# Patient Record
Sex: FEMALE | Race: WHITE | NOT HISPANIC OR LATINO | Employment: UNEMPLOYED | URBAN - NONMETROPOLITAN AREA
[De-identification: names, ages, dates, MRNs, and addresses within clinical notes are randomized per-mention and may not be internally consistent; named-entity substitution may affect disease eponyms.]

---

## 2020-10-13 ENCOUNTER — HOSPITAL ENCOUNTER (OUTPATIENT)
Dept: ULTRASOUND IMAGING | Facility: HOSPITAL | Age: 30
Discharge: HOME | End: 2020-10-13
Admitting: FAMILY MEDICINE

## 2020-10-13 ENCOUNTER — TRANSCRIBE ORDERS (OUTPATIENT)
Dept: ADMINISTRATIVE | Facility: HOSPITAL | Age: 30
End: 2020-10-13

## 2020-10-13 DIAGNOSIS — R10.84 GENERALIZED ABDOMINAL PAIN: Primary | ICD-10-CM

## 2020-10-13 DIAGNOSIS — R10.84 GENERALIZED ABDOMINAL PAIN: ICD-10-CM

## 2020-10-13 PROCEDURE — 76705 ECHO EXAM OF ABDOMEN: CPT | Performed by: RADIOLOGY

## 2020-10-13 PROCEDURE — 76705 ECHO EXAM OF ABDOMEN: CPT

## 2020-12-07 ENCOUNTER — HOSPITAL ENCOUNTER (EMERGENCY)
Facility: HOSPITAL | Age: 30
Discharge: LEFT BEFORE DISCHARGE | End: 2020-12-07

## 2020-12-07 VITALS
SYSTOLIC BLOOD PRESSURE: 165 MMHG | WEIGHT: 226 LBS | DIASTOLIC BLOOD PRESSURE: 103 MMHG | HEART RATE: 119 BPM | RESPIRATION RATE: 18 BRPM | HEIGHT: 68 IN | BODY MASS INDEX: 34.25 KG/M2 | TEMPERATURE: 98 F | OXYGEN SATURATION: 100 %

## 2021-09-21 ENCOUNTER — OFFICE VISIT (OUTPATIENT)
Dept: GASTROENTEROLOGY | Facility: CLINIC | Age: 31
End: 2021-09-21

## 2021-09-21 VITALS
DIASTOLIC BLOOD PRESSURE: 97 MMHG | HEIGHT: 72 IN | SYSTOLIC BLOOD PRESSURE: 149 MMHG | WEIGHT: 245.2 LBS | BODY MASS INDEX: 33.21 KG/M2 | HEART RATE: 83 BPM

## 2021-09-21 DIAGNOSIS — R19.7 DIARRHEA, UNSPECIFIED TYPE: ICD-10-CM

## 2021-09-21 DIAGNOSIS — R10.11 RUQ PAIN: Primary | ICD-10-CM

## 2021-09-21 PROCEDURE — 99203 OFFICE O/P NEW LOW 30 MIN: CPT | Performed by: PHYSICIAN ASSISTANT

## 2021-09-21 RX ORDER — OMEPRAZOLE 20 MG/1
20 CAPSULE, DELAYED RELEASE ORAL DAILY
COMMUNITY
Start: 2021-08-20 | End: 2022-07-05 | Stop reason: HOSPADM

## 2021-09-21 RX ORDER — DICYCLOMINE HYDROCHLORIDE 10 MG/1
CAPSULE ORAL
COMMUNITY
Start: 2021-08-20 | End: 2021-09-21

## 2021-09-21 NOTE — PROGRESS NOTES
Chief Complaint   Patient presents with   • Diarrhea       Sara Deng is a 31 y.o. female who presents to the office today for evaluation of Diarrhea  .    HPI  Patient presents to the clinic today for diarrhaea. She states that this issue has been occurring for roughly 1 year. She noticed a dramatic change in her bowel movements out of nowhere. She states that she started having diarrhea episodes that are type VI-VII on the [place] stool scale. She has seen blood and mucus in the stools quite frequently-at least weekly. She states she is suffering from a lot of pain in the right upper quadrant area. She does experience more diarrhea after eating that occurs with urgency and abdominal cramping.  She states she has quite a bit of abdominal bloating as well.  She has previously had an ultrasound of her gallbladder performed that did not reveal any abnormalities.  Review of Systems   Constitutional: Negative for fever.   HENT: Negative for trouble swallowing.    Eyes: Negative.    Respiratory: Negative for chest tightness.    Cardiovascular: Negative for chest pain.   Gastrointestinal: Positive for abdominal distention, abdominal pain and diarrhea. Negative for anal bleeding, blood in stool, constipation, nausea and vomiting.   Endocrine: Negative.    Genitourinary: Negative for difficulty urinating.   Musculoskeletal: Positive for back pain.   Skin: Negative.    Allergic/Immunologic: Negative for environmental allergies and food allergies.   Neurological: Negative for headaches.   Hematological: Does not bruise/bleed easily.   Psychiatric/Behavioral: Negative.        ACTIVE PROBLEMS:   Specialty Problems     None          PAST MEDICAL HISTORY:  Past Medical History:   Diagnosis Date   • Arthritis        SURGICAL HISTORY:  History reviewed. No pertinent surgical history.    FAMILY HISTORY:  Family History   Problem Relation Age of Onset   • No Known Problems Mother    • No Known Problems Father        SOCIAL  "HISTORY:  Social History     Tobacco Use   • Smoking status: Current Every Day Smoker   • Smokeless tobacco: Never Used   Substance Use Topics   • Alcohol use: Not Currently       CURRENT MEDICATION:    Current Outpatient Medications:   •  omeprazole (priLOSEC) 20 MG capsule, Take 20 mg by mouth Daily., Disp: , Rfl:     ALLERGIES:  Tylenol with codeine #3 [acetaminophen-codeine]    VISIT VITALS:  /97 (BP Location: Left arm, Patient Position: Sitting, Cuff Size: Adult)   Pulse 83   Ht 185.4 cm (73\")   Wt 111 kg (245 lb 3.2 oz)   BMI 32.35 kg/m²   Physical Exam  Constitutional:       General: She is not in acute distress.     Appearance: Normal appearance. She is well-developed.   HENT:      Head: Normocephalic and atraumatic.   Eyes:      Pupils: Pupils are equal, round, and reactive to light.   Cardiovascular:      Rate and Rhythm: Normal rate and regular rhythm.      Heart sounds: Normal heart sounds.   Pulmonary:      Effort: Pulmonary effort is normal. No respiratory distress.      Breath sounds: Normal breath sounds. No wheezing, rhonchi or rales.   Abdominal:      General: Abdomen is flat. Bowel sounds are normal. There is no distension.      Palpations: Abdomen is soft. There is no mass.      Tenderness: There is abdominal tenderness (RUQ ). There is no guarding or rebound.      Hernia: No hernia is present.   Musculoskeletal:         General: No swelling. Normal range of motion.      Cervical back: Normal range of motion and neck supple.      Right lower leg: No edema.      Left lower leg: No edema.   Skin:     General: Skin is warm and dry.   Neurological:      Mental Status: She is alert and oriented to person, place, and time.   Psychiatric:         Attention and Perception: Attention normal.         Mood and Affect: Mood normal.         Speech: Speech normal.         Behavior: Behavior normal. Behavior is cooperative.         Thought Content: Thought content normal.         Assessment/Plan   I " will go ahead and get the patient scheduled for a HIDA scan - she has had an US done roughly a year ago that did not reveal any abnormalities to the gallbladder. If the HIDA scan comes back negative - We will explore other options for the cause of the diarrhea.    Diagnosis Plan   1. RUQ pain  NM HIDA Scan With Pharmacological Intervention   2. Diarrhea, unspecified type  NM HIDA Scan With Pharmacological Intervention       Return in about 4 weeks (around 10/19/2021).                   This document has been electronically signed by Shannon Alvarez PA-C  September 22, 2021 15:38 EDT    Part of this note may be an electronic transcription/translation of spoken language to printed text using the Dragon Dictation System.

## 2021-10-15 ENCOUNTER — HOSPITAL ENCOUNTER (OUTPATIENT)
Dept: NUCLEAR MEDICINE | Facility: HOSPITAL | Age: 31
Discharge: HOME | End: 2021-10-15

## 2021-10-15 DIAGNOSIS — R19.7 DIARRHEA, UNSPECIFIED TYPE: ICD-10-CM

## 2021-10-15 DIAGNOSIS — R10.11 RUQ PAIN: ICD-10-CM

## 2021-10-15 PROCEDURE — 25010000002 SINCALIDE PER 5 MCG: Performed by: PHYSICIAN ASSISTANT

## 2021-10-15 PROCEDURE — 78227 HEPATOBIL SYST IMAGE W/DRUG: CPT

## 2021-10-15 PROCEDURE — 0 TECHNETIUM TC 99M MEBROFENIN KIT: Performed by: PHYSICIAN ASSISTANT

## 2021-10-15 PROCEDURE — 78227 HEPATOBIL SYST IMAGE W/DRUG: CPT | Performed by: RADIOLOGY

## 2021-10-15 PROCEDURE — A9537 TC99M MEBROFENIN: HCPCS | Performed by: PHYSICIAN ASSISTANT

## 2021-10-15 RX ORDER — KIT FOR THE PREPARATION OF TECHNETIUM TC 99M MEBROFENIN 45 MG/10ML
1 INJECTION, POWDER, LYOPHILIZED, FOR SOLUTION INTRAVENOUS
Status: COMPLETED | OUTPATIENT
Start: 2021-10-15 | End: 2021-10-15

## 2021-10-15 RX ADMIN — SINCALIDE 5 MCG: 5 INJECTION, POWDER, LYOPHILIZED, FOR SOLUTION INTRAVENOUS at 08:00

## 2021-10-15 RX ADMIN — MEBROFENIN 1 DOSE: 45 INJECTION, POWDER, LYOPHILIZED, FOR SOLUTION INTRAVENOUS at 07:10

## 2021-10-19 ENCOUNTER — TELEPHONE (OUTPATIENT)
Dept: GASTROENTEROLOGY | Facility: CLINIC | Age: 31
End: 2021-10-19

## 2021-10-19 ENCOUNTER — OFFICE VISIT (OUTPATIENT)
Dept: GASTROENTEROLOGY | Facility: CLINIC | Age: 31
End: 2021-10-19

## 2021-10-19 VITALS
SYSTOLIC BLOOD PRESSURE: 140 MMHG | DIASTOLIC BLOOD PRESSURE: 92 MMHG | HEIGHT: 72 IN | WEIGHT: 247.6 LBS | HEART RATE: 84 BPM | BODY MASS INDEX: 33.54 KG/M2

## 2021-10-19 DIAGNOSIS — K58.0 IRRITABLE BOWEL SYNDROME WITH DIARRHEA: Primary | ICD-10-CM

## 2021-10-19 PROCEDURE — 99214 OFFICE O/P EST MOD 30 MIN: CPT | Performed by: PHYSICIAN ASSISTANT

## 2021-10-19 RX ORDER — ALOSETRON HYDROCHLORIDE 0.5 MG/1
0.5 TABLET, FILM COATED ORAL 2 TIMES DAILY
Qty: 60 TABLET | Refills: 11 | Status: SHIPPED | OUTPATIENT
Start: 2021-10-19 | End: 2022-05-26

## 2021-10-19 NOTE — PROGRESS NOTES
Chief Complaint   Patient presents with   • Abdominal Pain       Sara Deng is a 31 y.o. female who presents to the office today for evaluation of Abdominal Pain  .    HPI  Patient presents to the clinic today for follow up of chronic diarrhea and abdominal pain. She states that she is doing roughly the same since she was seen in clinic roughly a month ago. She had her HIDA scan completed that reveal functioning at 81%. She is still having several episodes of diarrhea daily that are type 5-7 on the BSS. She has previously seen blood and mucus in her stool. She is still having pain in the RUQ area. Diarrhea seems to be worsened by eating.     Review of Systems   Constitutional: Negative for fever.   HENT: Negative for trouble swallowing.    Eyes: Negative.    Respiratory: Negative for chest tightness.    Cardiovascular: Negative for chest pain.   Gastrointestinal: Positive for abdominal distention, abdominal pain, anal bleeding, blood in stool and diarrhea. Negative for constipation, nausea and vomiting.   Endocrine: Negative.    Genitourinary: Negative for difficulty urinating.   Musculoskeletal: Positive for back pain.   Skin: Negative.    Allergic/Immunologic: Negative for environmental allergies and food allergies.   Neurological: Negative for headaches.   Hematological: Does not bruise/bleed easily.   Psychiatric/Behavioral: Negative.        ACTIVE PROBLEMS:   Specialty Problems     None          PAST MEDICAL HISTORY:  Past Medical History:   Diagnosis Date   • Arthritis        SURGICAL HISTORY:  History reviewed. No pertinent surgical history.    FAMILY HISTORY:  Family History   Problem Relation Age of Onset   • No Known Problems Mother    • No Known Problems Father        SOCIAL HISTORY:  Social History     Tobacco Use   • Smoking status: Current Every Day Smoker   • Smokeless tobacco: Never Used   Substance Use Topics   • Alcohol use: Not Currently       CURRENT MEDICATION:    Current Outpatient  "Medications:   •  omeprazole (priLOSEC) 20 MG capsule, Take 20 mg by mouth Daily., Disp: , Rfl:   •  alosetron (LOTRONEX) 0.5 MG tablet, Take 1 tablet by mouth 2 (Two) Times a Day., Disp: 60 tablet, Rfl: 11    ALLERGIES:  Tylenol with codeine #3 [acetaminophen-codeine]    VISIT VITALS:  /92 (BP Location: Left arm, Patient Position: Sitting, Cuff Size: Adult)   Pulse 84   Ht 185.4 cm (73\")   Wt 112 kg (247 lb 9.6 oz)   BMI 32.67 kg/m²   Physical Exam  Constitutional:       General: She is not in acute distress.     Appearance: Normal appearance. She is well-developed.   HENT:      Head: Normocephalic and atraumatic.   Eyes:      Pupils: Pupils are equal, round, and reactive to light.   Cardiovascular:      Rate and Rhythm: Normal rate and regular rhythm.      Heart sounds: Normal heart sounds.   Pulmonary:      Effort: Pulmonary effort is normal. No respiratory distress.      Breath sounds: Normal breath sounds. No wheezing, rhonchi or rales.   Abdominal:      General: Abdomen is flat. Bowel sounds are normal. There is no distension.      Palpations: Abdomen is soft. There is no mass.      Tenderness: There is no abdominal tenderness. There is no guarding or rebound.      Hernia: No hernia is present.   Musculoskeletal:         General: No swelling. Normal range of motion.      Cervical back: Normal range of motion and neck supple.      Right lower leg: No edema.      Left lower leg: No edema.   Skin:     General: Skin is warm and dry.   Neurological:      Mental Status: She is alert and oriented to person, place, and time.   Psychiatric:         Attention and Perception: Attention normal.         Mood and Affect: Mood normal.         Speech: Speech normal.         Behavior: Behavior normal. Behavior is cooperative.         Thought Content: Thought content normal.           Assessment/Plan   I will go ahead and start the patient on alosetron 0.5mg 2 times daily. She was informed about the medication and " explained the black box warning. She was instructed to d/c the medication immediately if any constipation occurs.    Diagnosis Plan   1. Irritable bowel syndrome with diarrhea  alosetron (LOTRONEX) 0.5 MG tablet       Return in about 4 weeks (around 11/16/2021).                   This document has been electronically signed by Shannon Alvarez PA-C  October 27, 2021 11:22 EDT    Part of this note may be an electronic transcription/translation of spoken language to printed text using the Dragon Dictation System.

## 2021-11-06 DIAGNOSIS — K58.0 IRRITABLE BOWEL SYNDROME WITH DIARRHEA: Primary | ICD-10-CM

## 2021-11-09 ENCOUNTER — TELEPHONE (OUTPATIENT)
Dept: GASTROENTEROLOGY | Facility: CLINIC | Age: 31
End: 2021-11-09

## 2021-11-09 NOTE — TELEPHONE ENCOUNTER
----- Message from Shannon Alvarez PA-C sent at 11/6/2021 10:55 PM EDT -----  Can you call Sara and her I will give her a trial of Xifaxan?  This may help with the diarrhea-she can discontinue the alosetron  ----- Message -----  From: Delia Marks MA  Sent: 11/1/2021   2:29 PM EDT  To: Shannon Alvarez PA-C    I called her. She said that it actually is not working.  ----- Message -----  From: Shannon Alvarez PA-C  Sent: 11/1/2021   1:46 PM EDT  To: [facility]    Do you care to check and see how Sara is doing on the Alosetron?

## 2022-03-02 ENCOUNTER — TRANSCRIBE ORDERS (OUTPATIENT)
Dept: ADMINISTRATIVE | Facility: HOSPITAL | Age: 32
End: 2022-03-02

## 2022-03-02 DIAGNOSIS — R10.84 ABDOMINAL PAIN, GENERALIZED: Primary | ICD-10-CM

## 2022-03-25 ENCOUNTER — HOSPITAL ENCOUNTER (OUTPATIENT)
Dept: ULTRASOUND IMAGING | Facility: HOSPITAL | Age: 32
Discharge: HOME | End: 2022-03-25

## 2022-03-25 DIAGNOSIS — R10.84 ABDOMINAL PAIN, GENERALIZED: ICD-10-CM

## 2022-03-25 PROCEDURE — 76705 ECHO EXAM OF ABDOMEN: CPT | Performed by: RADIOLOGY

## 2022-03-25 PROCEDURE — 76705 ECHO EXAM OF ABDOMEN: CPT

## 2022-04-27 ENCOUNTER — TRANSCRIBE ORDERS (OUTPATIENT)
Dept: ADMINISTRATIVE | Facility: HOSPITAL | Age: 32
End: 2022-04-27

## 2022-04-27 DIAGNOSIS — R10.84 ABDOMINAL PAIN, GENERALIZED: Primary | ICD-10-CM

## 2022-05-26 ENCOUNTER — OFFICE VISIT (OUTPATIENT)
Dept: GASTROENTEROLOGY | Facility: CLINIC | Age: 32
End: 2022-05-26

## 2022-05-26 VITALS
OXYGEN SATURATION: 95 % | HEART RATE: 91 BPM | BODY MASS INDEX: 34.81 KG/M2 | HEIGHT: 72 IN | SYSTOLIC BLOOD PRESSURE: 156 MMHG | WEIGHT: 257 LBS | DIASTOLIC BLOOD PRESSURE: 103 MMHG

## 2022-05-26 DIAGNOSIS — K21.9 GASTROESOPHAGEAL REFLUX DISEASE WITHOUT ESOPHAGITIS: ICD-10-CM

## 2022-05-26 DIAGNOSIS — R19.7 DIARRHEA, UNSPECIFIED TYPE: ICD-10-CM

## 2022-05-26 DIAGNOSIS — K58.0 IRRITABLE BOWEL SYNDROME WITH DIARRHEA: ICD-10-CM

## 2022-05-26 DIAGNOSIS — R10.11 RUQ PAIN: Primary | ICD-10-CM

## 2022-05-26 DIAGNOSIS — K21.9 GASTROESOPHAGEAL REFLUX DISEASE WITHOUT ESOPHAGITIS: Primary | ICD-10-CM

## 2022-05-26 PROCEDURE — 99214 OFFICE O/P EST MOD 30 MIN: CPT | Performed by: PHYSICIAN ASSISTANT

## 2022-05-26 RX ORDER — BISACODYL 5 MG/1
20 TABLET, DELAYED RELEASE ORAL ONCE
Qty: 4 TABLET | Refills: 0 | Status: SHIPPED | OUTPATIENT
Start: 2022-05-26 | End: 2022-05-26

## 2022-05-26 RX ORDER — MAGNESIUM CARB/ALUMINUM HYDROX 105-160MG
296 TABLET,CHEWABLE ORAL TAKE AS DIRECTED
Qty: 592 ML | Refills: 0 | Status: SHIPPED | OUTPATIENT
Start: 2022-05-26 | End: 2022-07-05 | Stop reason: HOSPADM

## 2022-05-26 NOTE — PROGRESS NOTES
Chief Complaint   Patient presents with   • Diarrhea       Sara Deng is a 31 y.o. female who presents to the office today for evaluation of Diarrhea  .    HPI  Patient presents to the clinic today for follow-up of chronic diarrhea.  Patient states that this has been going on for roughly 1.4 years currently.  Patient states that this originally started out of nowhere.  She has bowel movements that range from type VI-VII on the [place] stool scale several times daily.  Patient states that most of her bowel movements do occur with urgency.  She has noted bright red blood per rectum on several occasions.  We have checked her gallbladder functionality which was considered normal.  She has also tried alosetron 2.5 mg twice daily as well as Xifaxan 550 mg 3 times daily for 14 days which did not improve diarrhea.  She has tried adding additional fiber supplementation into her diet.  Patient states she is also tried over-the-counter remedies such as Imodium without results.  Patient states that in addition to the diarrhea she is also experiencing heartburn/reflux symptoms.  Symptoms are only occasionally controlled with medication.  Patient does experience some sharp pain in the epigastric area especially after eating spicy or acidic foods.    Review of Systems   Constitutional: Negative for fever.   HENT: Negative for trouble swallowing.    Eyes: Negative.    Respiratory: Negative for chest tightness.    Cardiovascular: Negative for chest pain.   Gastrointestinal: Positive for abdominal distention, abdominal pain, anal bleeding, blood in stool and diarrhea. Negative for constipation, nausea, rectal pain and vomiting.   Endocrine: Negative.    Genitourinary: Negative for difficulty urinating.   Musculoskeletal: Positive for back pain.   Skin: Negative.    Allergic/Immunologic: Negative for environmental allergies and food allergies.   Neurological: Negative for headaches.   Hematological: Does not bruise/bleed  "easily.   Psychiatric/Behavioral: Negative.        ACTIVE PROBLEMS:   Specialty Problems    None         PAST MEDICAL HISTORY:  Past Medical History:   Diagnosis Date   • Arthritis        SURGICAL HISTORY:  History reviewed. No pertinent surgical history.    FAMILY HISTORY:  Family History   Problem Relation Age of Onset   • No Known Problems Mother    • No Known Problems Father        SOCIAL HISTORY:  Social History     Tobacco Use   • Smoking status: Current Every Day Smoker   • Smokeless tobacco: Never Used   Substance Use Topics   • Alcohol use: Not Currently       CURRENT MEDICATION:    Current Outpatient Medications:   •  omeprazole (priLOSEC) 20 MG capsule, Take 20 mg by mouth Daily., Disp: , Rfl:   •  magnesium citrate 1.745 GM/30ML solution solution, Take 296 mL by mouth Take As Directed for 2 doses. Take one full bottle at 4:00 PM and take second bottle at 10:00 PM., Disp: 592 mL, Rfl: 0    ALLERGIES:  Tylenol with codeine #3 [acetaminophen-codeine]    VISIT VITALS:  BP (!) 156/103   Pulse 91   Ht 185.4 cm (73\")   Wt 117 kg (257 lb)   SpO2 95%   BMI 33.91 kg/m²   Physical Exam  Constitutional:       General: She is not in acute distress.     Appearance: Normal appearance. She is well-developed. She is obese.   HENT:      Head: Normocephalic and atraumatic.   Eyes:      Pupils: Pupils are equal, round, and reactive to light.   Cardiovascular:      Rate and Rhythm: Normal rate and regular rhythm.      Heart sounds: Normal heart sounds.   Pulmonary:      Effort: Pulmonary effort is normal. No respiratory distress.      Breath sounds: Normal breath sounds. No wheezing, rhonchi or rales.   Abdominal:      General: Abdomen is flat. Bowel sounds are normal. There is no distension.      Palpations: Abdomen is soft. There is no mass.      Tenderness: There is no abdominal tenderness. There is no guarding or rebound.      Hernia: No hernia is present.   Musculoskeletal:         General: No swelling. Normal " range of motion.      Cervical back: Normal range of motion and neck supple.      Right lower leg: No edema.      Left lower leg: No edema.   Skin:     General: Skin is warm and dry.   Neurological:      Mental Status: She is alert and oriented to person, place, and time.   Psychiatric:         Attention and Perception: Attention normal.         Mood and Affect: Mood normal.         Speech: Speech normal.         Behavior: Behavior normal. Behavior is cooperative.         Thought Content: Thought content normal.         Assessment    Due to the patient's symptoms not improving-I will go ahead and get her scheduled to have an EGD/colonoscopy performed by Dr. Darling.  Both procedures were thoroughly explained to the patient she voiced understanding and agreement to the treatment plan.  Depending on results of colonoscopy-we will possibly proceed with Viberzi or Lomotil use in the future.   Diagnosis Plan   1. Gastroesophageal reflux disease without esophagitis  Case Request    Case Request    bisacodyl (DULCOLAX) 5 MG EC tablet    magnesium citrate 1.745 GM/30ML solution solution   2. Irritable bowel syndrome with diarrhea  Case Request    Case Request    bisacodyl (DULCOLAX) 5 MG EC tablet    magnesium citrate 1.745 GM/30ML solution solution       Return After procedure.                   This document has been electronically signed by Shannon Alvarez PA-C  May 29, 2022 21:43 EDT    Part of this note may be an electronic transcription/translation of spoken language to printed text using the Dragon Dictation System.

## 2022-06-17 PROBLEM — K21.9 GASTROESOPHAGEAL REFLUX DISEASE WITHOUT ESOPHAGITIS: Status: ACTIVE | Noted: 2022-06-17

## 2022-06-17 PROBLEM — K58.0 IRRITABLE BOWEL SYNDROME WITH DIARRHEA: Status: ACTIVE | Noted: 2022-06-17

## 2022-07-01 ENCOUNTER — LAB (OUTPATIENT)
Dept: LAB | Facility: HOSPITAL | Age: 32
End: 2022-07-01

## 2022-07-01 DIAGNOSIS — K21.9 GASTROESOPHAGEAL REFLUX DISEASE WITHOUT ESOPHAGITIS: ICD-10-CM

## 2022-07-01 DIAGNOSIS — R19.7 DIARRHEA, UNSPECIFIED TYPE: ICD-10-CM

## 2022-07-01 DIAGNOSIS — R10.11 RUQ PAIN: ICD-10-CM

## 2022-07-01 DIAGNOSIS — K58.0 IRRITABLE BOWEL SYNDROME WITH DIARRHEA: ICD-10-CM

## 2022-07-01 PROCEDURE — U0004 COV-19 TEST NON-CDC HGH THRU: HCPCS

## 2022-07-01 PROCEDURE — C9803 HOPD COVID-19 SPEC COLLECT: HCPCS

## 2022-07-02 LAB — SARS-COV-2 RNA PNL SPEC NAA+PROBE: NOT DETECTED

## 2022-07-05 ENCOUNTER — ANESTHESIA (OUTPATIENT)
Dept: PERIOP | Facility: HOSPITAL | Age: 32
End: 2022-07-05

## 2022-07-05 ENCOUNTER — HOSPITAL ENCOUNTER (OUTPATIENT)
Facility: HOSPITAL | Age: 32
Setting detail: HOSPITAL OUTPATIENT SURGERY
Discharge: HOME | End: 2022-07-05
Attending: INTERNAL MEDICINE | Admitting: INTERNAL MEDICINE

## 2022-07-05 ENCOUNTER — ANESTHESIA EVENT (OUTPATIENT)
Dept: PERIOP | Facility: HOSPITAL | Age: 32
End: 2022-07-05

## 2022-07-05 VITALS
RESPIRATION RATE: 18 BRPM | SYSTOLIC BLOOD PRESSURE: 138 MMHG | BODY MASS INDEX: 37.25 KG/M2 | TEMPERATURE: 97.3 F | DIASTOLIC BLOOD PRESSURE: 81 MMHG | WEIGHT: 275 LBS | HEART RATE: 68 BPM | OXYGEN SATURATION: 97 % | HEIGHT: 72 IN

## 2022-07-05 DIAGNOSIS — K21.9 GASTROESOPHAGEAL REFLUX DISEASE WITHOUT ESOPHAGITIS: ICD-10-CM

## 2022-07-05 DIAGNOSIS — K58.0 IRRITABLE BOWEL SYNDROME WITH DIARRHEA: ICD-10-CM

## 2022-07-05 LAB
B-HCG UR QL: NEGATIVE
EXPIRATION DATE: NORMAL
INTERNAL NEGATIVE CONTROL: NORMAL
INTERNAL POSITIVE CONTROL: NORMAL
Lab: NORMAL

## 2022-07-05 PROCEDURE — 25010000002 PROPOFOL 10 MG/ML EMULSION: Performed by: NURSE ANESTHETIST, CERTIFIED REGISTERED

## 2022-07-05 PROCEDURE — 43239 EGD BIOPSY SINGLE/MULTIPLE: CPT | Performed by: INTERNAL MEDICINE

## 2022-07-05 PROCEDURE — 81025 URINE PREGNANCY TEST: CPT | Performed by: INTERNAL MEDICINE

## 2022-07-05 PROCEDURE — 45385 COLONOSCOPY W/LESION REMOVAL: CPT | Performed by: INTERNAL MEDICINE

## 2022-07-05 RX ORDER — MEPERIDINE HYDROCHLORIDE 25 MG/ML
12.5 INJECTION INTRAMUSCULAR; INTRAVENOUS; SUBCUTANEOUS
Status: DISCONTINUED | OUTPATIENT
Start: 2022-07-05 | End: 2022-07-05 | Stop reason: HOSPADM

## 2022-07-05 RX ORDER — CALCIUM POLYCARBOPHIL 625 MG
1250 TABLET ORAL DAILY
Qty: 60 TABLET | Refills: 3 | Status: SHIPPED | OUTPATIENT
Start: 2022-07-05

## 2022-07-05 RX ORDER — PROPOFOL 10 MG/ML
VIAL (ML) INTRAVENOUS AS NEEDED
Status: DISCONTINUED | OUTPATIENT
Start: 2022-07-05 | End: 2022-07-05 | Stop reason: SURG

## 2022-07-05 RX ORDER — IPRATROPIUM BROMIDE AND ALBUTEROL SULFATE 2.5; .5 MG/3ML; MG/3ML
3 SOLUTION RESPIRATORY (INHALATION) ONCE AS NEEDED
Status: DISCONTINUED | OUTPATIENT
Start: 2022-07-05 | End: 2022-07-05 | Stop reason: HOSPADM

## 2022-07-05 RX ORDER — FENTANYL CITRATE 50 UG/ML
50 INJECTION, SOLUTION INTRAMUSCULAR; INTRAVENOUS
Status: DISCONTINUED | OUTPATIENT
Start: 2022-07-05 | End: 2022-07-05 | Stop reason: HOSPADM

## 2022-07-05 RX ORDER — PANTOPRAZOLE SODIUM 40 MG/1
40 TABLET, DELAYED RELEASE ORAL DAILY
Qty: 90 TABLET | Refills: 3 | OUTPATIENT
Start: 2022-07-05

## 2022-07-05 RX ORDER — SODIUM CHLORIDE 0.9 % (FLUSH) 0.9 %
10 SYRINGE (ML) INJECTION AS NEEDED
Status: DISCONTINUED | OUTPATIENT
Start: 2022-07-05 | End: 2022-07-05 | Stop reason: HOSPADM

## 2022-07-05 RX ORDER — MIDAZOLAM HYDROCHLORIDE 1 MG/ML
1 INJECTION INTRAMUSCULAR; INTRAVENOUS
Status: DISCONTINUED | OUTPATIENT
Start: 2022-07-05 | End: 2022-07-05 | Stop reason: HOSPADM

## 2022-07-05 RX ORDER — SODIUM CHLORIDE, SODIUM LACTATE, POTASSIUM CHLORIDE, CALCIUM CHLORIDE 600; 310; 30; 20 MG/100ML; MG/100ML; MG/100ML; MG/100ML
100 INJECTION, SOLUTION INTRAVENOUS ONCE AS NEEDED
Status: DISCONTINUED | OUTPATIENT
Start: 2022-07-05 | End: 2022-07-05 | Stop reason: HOSPADM

## 2022-07-05 RX ORDER — KETOROLAC TROMETHAMINE 30 MG/ML
30 INJECTION, SOLUTION INTRAMUSCULAR; INTRAVENOUS EVERY 6 HOURS PRN
Status: DISCONTINUED | OUTPATIENT
Start: 2022-07-05 | End: 2022-07-05 | Stop reason: HOSPADM

## 2022-07-05 RX ORDER — LIDOCAINE HYDROCHLORIDE 20 MG/ML
INJECTION, SOLUTION INFILTRATION; PERINEURAL AS NEEDED
Status: DISCONTINUED | OUTPATIENT
Start: 2022-07-05 | End: 2022-07-05 | Stop reason: SURG

## 2022-07-05 RX ORDER — PANTOPRAZOLE SODIUM 40 MG/1
40 TABLET, DELAYED RELEASE ORAL DAILY
Qty: 90 TABLET | Refills: 3 | Status: SHIPPED | OUTPATIENT
Start: 2022-07-05 | End: 2022-09-09

## 2022-07-05 RX ORDER — ONDANSETRON 2 MG/ML
4 INJECTION INTRAMUSCULAR; INTRAVENOUS AS NEEDED
Status: DISCONTINUED | OUTPATIENT
Start: 2022-07-05 | End: 2022-07-05 | Stop reason: HOSPADM

## 2022-07-05 RX ORDER — SODIUM CHLORIDE, SODIUM LACTATE, POTASSIUM CHLORIDE, CALCIUM CHLORIDE 600; 310; 30; 20 MG/100ML; MG/100ML; MG/100ML; MG/100ML
125 INJECTION, SOLUTION INTRAVENOUS ONCE
Status: COMPLETED | OUTPATIENT
Start: 2022-07-05 | End: 2022-07-05

## 2022-07-05 RX ORDER — SODIUM CHLORIDE 0.9 % (FLUSH) 0.9 %
10 SYRINGE (ML) INJECTION EVERY 12 HOURS SCHEDULED
Status: DISCONTINUED | OUTPATIENT
Start: 2022-07-05 | End: 2022-07-05 | Stop reason: HOSPADM

## 2022-07-05 RX ADMIN — PROPOFOL 200 MCG/KG/MIN: 10 INJECTION, EMULSION INTRAVENOUS at 10:53

## 2022-07-05 RX ADMIN — PROPOFOL 50 MG: 10 INJECTION, EMULSION INTRAVENOUS at 10:53

## 2022-07-05 RX ADMIN — SODIUM CHLORIDE, POTASSIUM CHLORIDE, SODIUM LACTATE AND CALCIUM CHLORIDE: 600; 310; 30; 20 INJECTION, SOLUTION INTRAVENOUS at 10:53

## 2022-07-05 RX ADMIN — LIDOCAINE HYDROCHLORIDE 60 MG: 20 INJECTION, SOLUTION INFILTRATION; PERINEURAL at 10:53

## 2022-07-05 NOTE — ANESTHESIA PREPROCEDURE EVALUATION
Anesthesia Evaluation     no history of anesthetic complications:  NPO Solid Status: > 8 hours  NPO Liquid Status: > 8 hours           Airway   Mallampati: II  TM distance: >3 FB  Neck ROM: full  No difficulty expected  Dental - normal exam     Pulmonary - normal exam   (+) a smoker Current,   Cardiovascular - normal exam        Neuro/Psych  GI/Hepatic/Renal/Endo    (+) obesity,  GERD,      Musculoskeletal     Abdominal  - normal exam    Bowel sounds: normal.   Substance History      OB/GYN          Other                        Anesthesia Plan    ASA 3     general     intravenous induction     Anesthetic plan, risks, benefits, and alternatives have been provided, discussed and informed consent has been obtained with: patient.        CODE STATUS:

## 2022-07-05 NOTE — ANESTHESIA POSTPROCEDURE EVALUATION
Patient: Sara Deng    Procedure Summary     Date: 07/05/22 Room / Location: [facility] OR  /  [facility] OR    Anesthesia Start: 1051 Anesthesia Stop: 1130    Procedures:       ESOPHAGOGASTRODUODENOSCOPY WITH BIOPSY (N/A Esophagus)      COLONOSCOPY FOR SCREENING (N/A ) Diagnosis:       Gastroesophageal reflux disease without esophagitis      Irritable bowel syndrome with diarrhea      (Gastroesophageal reflux disease without esophagitis [K21.9])      (Irritable bowel syndrome with diarrhea [K58.0])    Surgeons: Anel Barrera MD Provider: Jerome Torres MD    Anesthesia Type: general ASA Status: 3          Anesthesia Type: general    Vitals  Vitals Value Taken Time   /81 07/05/22 1200   Temp 97.3 °F (36.3 °C) 07/05/22 1130   Pulse 68 07/05/22 1200   Resp 18 07/05/22 1200   SpO2 97 % 07/05/22 1200           Post Anesthesia Care and Evaluation    Patient location during evaluation: PHASE II  Patient participation: complete - patient participated  Level of consciousness: awake and alert  Pain score: 1  Pain management: adequate    Airway patency: patent  Anesthetic complications: No anesthetic complications  PONV Status: controlled  Cardiovascular status: acceptable  Respiratory status: acceptable  Hydration status: acceptable

## 2022-07-05 NOTE — H&P
Chief complaint  Diarrhea, rectal bleeding, abdominal pain    Subjective     Patient is a 31 y.o. female who presents today for an EGD and colonoscopy.   The patient was seen in our clinic in May of this year for evaluation of chronic diarrhea.  She also reports fecal urgency and bright red blood per rectum.  Patient has tried multiple medications for diarrhea without success.  She is also reporting heartburn that is usually not controlled with omeprazole 20 mg taken daily.  She has sharp epigastric pain that is worse after eating spicy and acidic foods.  Family history is negative for GI disease.  Medical, surgical, and social histories were reviewed and are listed below.      Review of Systems  Review of Systems - General ROS: negative for - weight loss  Psychological ROS: negative for - behavioral disorder  Ophthalmic ROS: negative for - dry eyes  ENT ROS: negative for - vertigo or vocal changes  Hematological and Lymphatic ROS: negative for - jaundice or swollen lymph nodes  Respiratory ROS: negative for - sputum changes or stridor  Cardiovascular ROS: negative for - irregular heartbeat or murmur  Gastrointestinal ROS: positive for-diarrhea, fecal urgency, bright red blood per rectum, heartburn, epigastric pain; negative for - blood in stools or change in stools  Genitourinary ROS: negative for - hematuria or incontinence  Musculoskeletal ROS: negative for - gait disturbance      History  Past Medical History:   Diagnosis Date   • Arthritis      Past Surgical History:   Procedure Laterality Date   • WISDOM TOOTH EXTRACTION N/A      Family History   Problem Relation Age of Onset   • No Known Problems Mother    • No Known Problems Father      Social History     Tobacco Use   • Smoking status: Current Every Day Smoker     Packs/day: 0.50     Years: 20.00     Pack years: 10.00     Types: Cigarettes   • Smokeless tobacco: Never Used   Vaping Use   • Vaping Use: Never used   Substance Use Topics   • Alcohol use:  Not Currently     Comment: Occasionally   • Drug use: Never     Medications Prior to Admission   Medication Sig Dispense Refill Last Dose   • magnesium citrate 1.745 GM/30ML solution solution Take 296 mL by mouth Take As Directed for 2 doses. Take one full bottle at 4:00 PM and take second bottle at 10:00 PM. 592 mL 0 7/4/2022 at Unknown time   • omeprazole (priLOSEC) 20 MG capsule Take 20 mg by mouth Daily.   7/4/2022 at Unknown time     Allergies:  Tylenol with codeine #3 [acetaminophen-codeine]    Objective     Vital Signs  Temp:  [98.5 °F (36.9 °C)] 98.5 °F (36.9 °C)  Heart Rate:  [85] 85  Resp:  [20] 20  BP: (125)/(88) 125/88    Physical Exam  General:  This is a WD pleasant female in no acute distress  Vital signs: Stable, afebrile  HEENT exam:  WNL. Sclerae are anicteric.  EOMI  Neck: Supple, FROM.  No JVD.  Trachea midline  Lungs:  Respiratory effort normal. Auscultation: Clear, without wheezes, rhonchi, rales  Heart:  Regular rate and rhythm, without murmur, gallop, rub.  No pedal edema  Abdomen:  No tenderness or palpable masses;  Bowel sounds normal  Musculoskeletal: Muscle strength/tone is normal.    Psych:  Alert, oriented x 3.  Mood and affect are appropriate  Skin:  Warm with good turgor.  Without rash or lesion  Extremities:  Examination of the extremities revealed no cyanosis, clubbing or edema.    Results Review:                     Invalid input(s): PROTCrCl cannot be calculated (No successful lab value found.).  No results found for: AMMONIA      No results found for: BLOODCX  No results found for: URINECX  No results found for: WOUNDCX  No results found for: STOOLCX    Imaging:  Imaging Results (Last 24 Hours)     ** No results found for the last 24 hours. **                Impression:  Patient Active Problem List   Diagnosis Code   • Gastroesophageal reflux disease without esophagitis K21.9   • Irritable bowel syndrome with diarrhea K58.0       Assessment:  1.  Bright red blood per rectum  2.   Chronic diarrhea  3.  Fecal urgency  4.  Heartburn  5.  Epigastric pain    Plan:  The patient will undergo an EGD and colonoscopy today.  She voiced understanding and agreement of procedures.    GERBER Hernandez  07/05/22  10:07 EDT

## 2022-07-07 LAB — REF LAB TEST METHOD: NORMAL

## 2022-07-08 NOTE — PROGRESS NOTES
Recently, you underwent upper endoscopy.  At that time biopsies were taken of the esophagus revealing esophagitis.  Please continue taking Protonix prescribed.  Biopsies of the stomach were negative for H. pylori gastritis.  Three of the 5 polyps removed from the colon were precancerous polyps called tubular adenomas.  You will need repeat colonoscopy in 5 years due to personal history of colon polyps.

## 2022-07-08 NOTE — PROGRESS NOTES
Vira, you underwent upper endoscopy.  Biopsies of the esophagus revealed reflux esophagitis.  I am changing you from omeprazole to Protonix.  This should be taken daily 30 minutes prior to eating in the morning.  He did have 5 polyps were removed.  The majority of the polyps were precancerous tubular adenomas.  We will repeat your colonoscopy in 3 years due to personal history of colon polyps.

## 2022-09-09 ENCOUNTER — OFFICE VISIT (OUTPATIENT)
Dept: GASTROENTEROLOGY | Facility: CLINIC | Age: 32
End: 2022-09-09

## 2022-09-09 DIAGNOSIS — K58.0 IRRITABLE BOWEL SYNDROME WITH DIARRHEA: ICD-10-CM

## 2022-09-09 DIAGNOSIS — K21.9 GASTROESOPHAGEAL REFLUX DISEASE WITHOUT ESOPHAGITIS: Primary | ICD-10-CM

## 2022-09-09 PROCEDURE — 99214 OFFICE O/P EST MOD 30 MIN: CPT | Performed by: PHYSICIAN ASSISTANT

## 2022-09-09 RX ORDER — PANTOPRAZOLE SODIUM 40 MG/1
40 TABLET, DELAYED RELEASE ORAL
Qty: 7 TABLET | Refills: 0 | Status: SHIPPED | OUTPATIENT
Start: 2022-09-09

## 2022-09-09 RX ORDER — DEXLANSOPRAZOLE 60 MG/1
60 CAPSULE, DELAYED RELEASE ORAL
Qty: 30 CAPSULE | Refills: 11 | Status: SHIPPED | OUTPATIENT
Start: 2022-09-09

## 2022-09-09 NOTE — PROGRESS NOTES
Chief Complaint   Patient presents with   • Heartburn       Sara Deng is a 32 y.o. female who presents to the office today for evaluation of Heartburn  .    HPI    Mrs. Deng presents today as a follow-up. She recently underwent EGD and colonoscopy with Dr. Darling on 07/05/2022. The patient reports her stomach is better than it was. She states she still has heartburn, but not has bad. She notes her diarrhea is the same. She reports the fiber helped her diarrhea at first. The patient states she can't get her Protonix filled right now due to insurance.  Her EGD showed a lot of irritation to the stomach lining therefore she probably needs to be placed on a stronger PPI medication.  Most concerningly she had 5 precancerous colon polyps removed-she will need to repeat a screening in roughly 5 years or sooner if issues arise.  In regards to her diarrhea-it is most likely linked to IBS.    EGD results:  - The examined esophagus was normal. Biopsies were taken with a cold forceps for histology.  - A small hiatal hernia was present.  - Patchy mildly erythematous mucosa without bleeding was found in the gastric antrum. Biopsies were taken with a cold  forceps for histology.  - The examined duodenum was normal.  Colonoscopy results:  - The perianal and digital rectal examinations were normal.  - Two sessile polyps were found in the transverse colon. The polyps were 5 to 6 mm in size. These polyps were  removed with a cold snare. Resection and retrieval were complete.  - Two sessile polyps were found in the sigmoid colon. The polyps were 4 to 5 mm in size. These polyps were removed  with a cold snare. Resection and retrieval were complete.  - A 15 mm polyp was found in the rectum. The polyp was sessile. The polyp was removed with a hot snare. Resection  and retrieval were complete. To prevent bleeding post-intervention, one hemostatic clip was successfully placed. There  was no bleeding at the end of the  procedure.  - Internal hemorrhoids were found during retroflexion. The hemorrhoids were Grade I (internal hemorrhoids that do not  prolapse).  - The terminal ileum appeared normal.  Review of Systems   Constitutional: Negative for fever.   HENT: Negative for trouble swallowing.    Eyes: Negative.    Respiratory: Negative for chest tightness.    Cardiovascular: Negative for chest pain.   Gastrointestinal: Positive for abdominal distention, abdominal pain, anal bleeding, blood in stool and diarrhea. Negative for constipation, nausea, rectal pain and vomiting.   Endocrine: Negative.    Genitourinary: Negative for difficulty urinating.   Musculoskeletal: Positive for back pain.   Skin: Negative.    Allergic/Immunologic: Negative for environmental allergies and food allergies.   Neurological: Negative for headaches.   Hematological: Does not bruise/bleed easily.   Psychiatric/Behavioral: Negative.        ACTIVE PROBLEMS:   Specialty Problems        Gastroenterology Problems    Gastroesophageal reflux disease without esophagitis        Irritable bowel syndrome with diarrhea              PAST MEDICAL HISTORY:  Past Medical History:   Diagnosis Date   • Arthritis        SURGICAL HISTORY:  Past Surgical History:   Procedure Laterality Date   • COLONOSCOPY N/A 7/5/2022    Procedure: COLONOSCOPY FOR SCREENING;  Surgeon: Anel Barrera MD;  Location: [facility];  Service: Gastroenterology;  Laterality: N/A;   • ENDOSCOPY N/A 7/5/2022    Procedure: ESOPHAGOGASTRODUODENOSCOPY WITH BIOPSY;  Surgeon: Anel Barrera MD;  Location: [facility];  Service: Gastroenterology;  Laterality: N/A;   • WISDOM TOOTH EXTRACTION N/A        FAMILY HISTORY:  Family History   Problem Relation Age of Onset   • No Known Problems Mother    • No Known Problems Father        SOCIAL HISTORY:  Social History     Tobacco Use   • Smoking status: Current Every Day Smoker     Packs/day: 0.50     Years: 20.00     Pack years: 10.00      Types: Cigarettes   • Smokeless tobacco: Never Used   Substance Use Topics   • Alcohol use: Not Currently     Comment: Occasionally       CURRENT MEDICATION:    Current Outpatient Medications:   •  calcium polycarbophil (FiberCon) 625 MG tablet, Take 2 tablets by mouth Daily., Disp: 60 tablet, Rfl: 3  •  dexlansoprazole (DEXILANT) 60 MG capsule, Take 1 capsule by mouth Every Morning Before Breakfast., Disp: 30 capsule, Rfl: 11  •  Eluxadoline 100 MG tablet, Take 1 tablet by mouth 2 (Two) Times a Day., Disp: 60 tablet, Rfl: 5  •  pantoprazole (PROTONIX) 40 MG EC tablet, Take 1 tablet by mouth Every Morning Before Breakfast. Take one capsule 30 minutes prior to eating breakfast, Disp: 7 tablet, Rfl: 0    ALLERGIES:  Tylenol with codeine #3 [acetaminophen-codeine]    VISIT VITALS:  There were no vitals taken for this visit.  Physical Exam  Constitutional:       General: She is not in acute distress.     Appearance: Normal appearance. She is well-developed.   HENT:      Head: Normocephalic and atraumatic.   Eyes:      Pupils: Pupils are equal, round, and reactive to light.   Cardiovascular:      Rate and Rhythm: Normal rate and regular rhythm.      Heart sounds: Normal heart sounds.   Pulmonary:      Effort: Pulmonary effort is normal. No respiratory distress.      Breath sounds: Normal breath sounds. No wheezing, rhonchi or rales.   Abdominal:      General: Abdomen is flat. Bowel sounds are normal. There is no distension.      Palpations: Abdomen is soft. There is no mass.      Tenderness: There is no abdominal tenderness. There is no guarding or rebound.      Hernia: No hernia is present.   Musculoskeletal:         General: No swelling. Normal range of motion.      Cervical back: Normal range of motion and neck supple.      Right lower leg: No edema.      Left lower leg: No edema.   Skin:     General: Skin is warm and dry.   Neurological:      Mental Status: She is alert and oriented to person, place, and time.    Psychiatric:         Attention and Perception: Attention normal.         Mood and Affect: Mood normal.         Speech: Speech normal.         Behavior: Behavior normal. Behavior is cooperative.         Thought Content: Thought content normal.         Assessment       Diagnosis Plan   1. Gastroesophageal reflux disease without esophagitis  dexlansoprazole (DEXILANT) 60 MG capsule    pantoprazole (PROTONIX) 40 MG EC tablet   2. Irritable bowel syndrome with diarrhea  Eluxadoline 100 MG tablet       Return in about 6 months (around 3/9/2023).       Patient's EGD/colonoscopy results were reviewed with her-she voiced understanding.  1. Irritable bowel syndrome diarrhea  I am prescribing Viberzi 100 mg-patient was told to start taking medication once daily and that that to twice daily if no constipation occurs.    2. Heartburn  I am going to try to get Dexilant covered for her for the patient's GERD not well controlled on other medications.  I am sending in a weeks worth of Protonix until we can get the Dexilant approved.          This document has been electronically signed by Shannon Jaimes PA-C  September 12, 2022 14:21 EDT    Part of this note may be an electronic transcription/translation of spoken language to printed text using the Dragon Dictation System.    Transcribed from ambient dictation for Shannon Jaimes PA-C by Paola Hollingsworth.  09/09/22   17:48 EDT    Patient verbalized consent to the visit recording.

## 2023-03-31 DIAGNOSIS — K58.0 IRRITABLE BOWEL SYNDROME WITH DIARRHEA: ICD-10-CM

## 2023-03-31 RX ORDER — ELUXADOLINE 100 MG/1
TABLET, FILM COATED ORAL
Qty: 60 TABLET | Refills: 5 | Status: SHIPPED | OUTPATIENT
Start: 2023-03-31

## 2023-05-02 ENCOUNTER — TRANSCRIBE ORDERS (OUTPATIENT)
Dept: ADMINISTRATIVE | Facility: HOSPITAL | Age: 33
End: 2023-05-02
Payer: COMMERCIAL

## 2023-05-02 ENCOUNTER — HOSPITAL ENCOUNTER (OUTPATIENT)
Dept: GENERAL RADIOLOGY | Facility: HOSPITAL | Age: 33
Discharge: HOME | End: 2023-05-02
Admitting: FAMILY MEDICINE
Payer: COMMERCIAL

## 2023-05-02 DIAGNOSIS — M25.561 RIGHT KNEE PAIN, UNSPECIFIED CHRONICITY: ICD-10-CM

## 2023-05-02 DIAGNOSIS — M25.561 RIGHT KNEE PAIN, UNSPECIFIED CHRONICITY: Primary | ICD-10-CM

## 2023-05-02 PROCEDURE — 73562 X-RAY EXAM OF KNEE 3: CPT

## 2023-05-02 PROCEDURE — 73562 X-RAY EXAM OF KNEE 3: CPT | Performed by: RADIOLOGY

## 2023-08-15 ENCOUNTER — OFFICE VISIT (OUTPATIENT)
Dept: GASTROENTEROLOGY | Facility: CLINIC | Age: 33
End: 2023-08-15
Payer: COMMERCIAL

## 2023-08-15 VITALS
HEART RATE: 78 BPM | DIASTOLIC BLOOD PRESSURE: 95 MMHG | BODY MASS INDEX: 39.75 KG/M2 | HEIGHT: 65 IN | SYSTOLIC BLOOD PRESSURE: 139 MMHG | WEIGHT: 238.6 LBS

## 2023-08-15 DIAGNOSIS — K58.0 IRRITABLE BOWEL SYNDROME WITH DIARRHEA: ICD-10-CM

## 2023-08-15 DIAGNOSIS — K21.9 GASTROESOPHAGEAL REFLUX DISEASE WITHOUT ESOPHAGITIS: Primary | ICD-10-CM

## 2023-08-15 RX ORDER — LANSOPRAZOLE 30 MG/1
30 CAPSULE, DELAYED RELEASE ORAL 2 TIMES DAILY
Qty: 180 CAPSULE | Refills: 3 | Status: SHIPPED | OUTPATIENT
Start: 2023-08-15

## 2023-08-15 NOTE — PROGRESS NOTES
DATE:  8/15/2023    DIAGNOSIS: GERD, IBS-d    CHIEF COMPLAINT:  Chief Complaint   Patient presents with    GERD     HPI     Mrs. Deng presents today as a follow-up. She recently underwent EGD and colonoscopy with Dr. Darling on 07/05/2022. The patient reports her stomach is better than it was. She states she still has heartburn, but not has bad. She notes her diarrhea is the same. She reports the fiber helped her diarrhea at first. The patient states she can't get her Protonix filled right now due to insurance.  Her EGD showed a lot of irritation to the stomach lining therefore she probably needs to be placed on a stronger PPI medication.  Most concerningly she had 5 precancerous colon polyps removed-she will need to repeat a screening in roughly 5 years or sooner if issues arise.  In regards to her diarrhea-it is most likely linked to IBS.     EGD results:  - The examined esophagus was normal. Biopsies were taken with a cold forceps for histology.  - A small hiatal hernia was present.  - Patchy mildly erythematous mucosa without bleeding was found in the gastric antrum. Biopsies were taken with a cold  forceps for histology.  - The examined duodenum was normal.  Colonoscopy results:  - The perianal and digital rectal examinations were normal.  - Two sessile polyps were found in the transverse colon. The polyps were 5 to 6 mm in size. These polyps were  removed with a cold snare. Resection and retrieval were complete.  - Two sessile polyps were found in the sigmoid colon. The polyps were 4 to 5 mm in size. These polyps were removed  with a cold snare. Resection and retrieval were complete.  - A 15 mm polyp was found in the rectum. The polyp was sessile. The polyp was removed with a hot snare. Resection  and retrieval were complete. To prevent bleeding post-intervention, one hemostatic clip was successfully placed. There  was no bleeding at the end of the procedure.  - Internal hemorrhoids were found during  retroflexion. The hemorrhoids were Grade I (internal hemorrhoids that do not  prolapse).      Interval History:  Patient reports doing fairly well since she was last seen in clinic.  She has been doing Viberzi 100 mg twice daily with good result.  Patient states with the medication however she has been experiencing some constipation issues.  With the medication she may go 1 to 2 days in between bowel movements.  She has not tried taking the medication once daily.  She denies any issues with breakthrough diarrhea while on the medication.  It is significantly cut down on abdominal pain and bloating.  She does admit to having occasional gas/bloating.  Recently she believes that the Dexilant 60 mg once daily did not be working as effectively as it should.  Patient states that she will take medication and it feels like it wears off towards the end of the day.  She is unable to eat anything very spicy or acidic without issues.  Due to the increase in heartburn/reflux symptoms she does experience pain occurring in the epigastric and left upper quadrant.    The following portions of the patient's history were reviewed and updated as appropriate: allergies, current medications, past family history, past medical history, past social history, past surgical history and problem list.  REVIEW OF SYSTEMS:   Review of Systems   Constitutional:  Negative for fever.   HENT:  Negative for trouble swallowing.    Eyes: Negative.    Respiratory:  Negative for chest tightness.    Cardiovascular:  Negative for chest pain.   Gastrointestinal:  Positive for abdominal distention, abdominal pain, constipation and diarrhea. Negative for anal bleeding, blood in stool, nausea, rectal pain and vomiting.   Endocrine: Negative.    Genitourinary:  Negative for difficulty urinating.   Musculoskeletal:  Positive for back pain.   Skin: Negative.    Allergic/Immunologic: Negative for environmental allergies and food allergies.   Neurological:  Negative  "for headaches.   Hematological:  Does not bruise/bleed easily.   Psychiatric/Behavioral: Negative.       PAST MEDICAL HISTORY:  Past Medical History:   Diagnosis Date    Arthritis        PAST SURGICAL HISTORY:  Past Surgical History:   Procedure Laterality Date    COLONOSCOPY N/A 7/5/2022    Procedure: COLONOSCOPY FOR SCREENING;  Surgeon: Anel Barrera MD;  Location: [facility];  Service: Gastroenterology;  Laterality: N/A;    ENDOSCOPY N/A 7/5/2022    Procedure: ESOPHAGOGASTRODUODENOSCOPY WITH BIOPSY;  Surgeon: Anel Barrera MD;  Location: [facility];  Service: Gastroenterology;  Laterality: N/A;    WISDOM TOOTH EXTRACTION N/A        SOCIAL HISTORY:  Social History     Socioeconomic History    Marital status: Single   Tobacco Use    Smoking status: Every Day     Packs/day: 0.50     Years: 20.00     Pack years: 10.00     Types: Cigarettes    Smokeless tobacco: Never   Vaping Use    Vaping Use: Never used   Substance and Sexual Activity    Alcohol use: Not Currently     Comment: Occasionally    Drug use: Never    Sexual activity: Defer       FAMILY HISTORY:  Family History   Problem Relation Age of Onset    No Known Problems Mother     No Known Problems Father        MEDICATIONS:      Current Outpatient Medications:     calcium polycarbophil (FiberCon) 625 MG tablet, Take 2 tablets by mouth Daily., Disp: 60 tablet, Rfl: 3    Eluxadoline 75 MG tablet, Take 75 mg by mouth 2 (Two) Times a Day., Disp: 60 tablet, Rfl: 5    lansoprazole (PREVACID) 30 MG capsule, Take 1 capsule by mouth 2 (Two) Times a Day., Disp: 180 capsule, Rfl: 3    ALLERGIES:    Allergies   Allergen Reactions    Tylenol With Codeine #3 [Acetaminophen-Codeine] Hives       VIST VITALS/PHYSICAL EXAM:  /95   Pulse 78   Ht 165.1 cm (65\")   Wt 108 kg (238 lb 9.6 oz)   BMI 39.71 kg/mý   Physical Exam  Constitutional:       General: She is not in acute distress.     Appearance: Normal appearance. She is well-developed.   HENT: "      Head: Normocephalic and atraumatic.   Eyes:      Pupils: Pupils are equal, round, and reactive to light.   Pulmonary:      Effort: Pulmonary effort is normal. No respiratory distress.   Abdominal:      General: Abdomen is flat. There is no distension.      Palpations: Abdomen is soft. There is no mass.      Tenderness: There is no abdominal tenderness. There is no guarding or rebound.      Hernia: No hernia is present.   Musculoskeletal:         General: No swelling. Normal range of motion.      Cervical back: Normal range of motion.   Skin:     General: Skin is warm and dry.   Neurological:      Mental Status: She is alert and oriented to person, place, and time.   Psychiatric:         Attention and Perception: Attention normal.         Mood and Affect: Mood normal.         Speech: Speech normal.         Behavior: Behavior normal. Behavior is cooperative.         Thought Content: Thought content normal.         PATHOLOGY:  TISSUE EXAM, [facility]) (07/05/2022 11:03)       ENDOSCOPY:  UPPER GI ENDOSCOPY (07/05/2022 10:52)   COLONOSCOPY (07/05/2022 11:05)     IMAGING:  XR Knee 3 View Right    Result Date: 5/2/2023    No acute findings in the right knee.  This report was finalized on 5/2/2023 10:43 AM by Dr. Nasir Donnelly MD.            RECENT LABS:  No results found for: WBC, HGB, HCT, MCV, RDW, PLT, NEUTRORELPCT, LYMPHORELPCT, MONORELPCT, EOSRELPCT, BASORELPCT, NEUTROABS, LYMPHSABS    No results found for: NA, K, CO2, CL, BUN, CREATININE, EGFRIFNONA, EGFRIFAFRI, GLUCOSE, CALCIUM, ALKPHOS, AST, ALT, BILITOT, ALBUMIN, PROTEINTOT, MG, PHOS          ASSESSMENT & PLAN:  Based on the patient's symptoms-we will continue the Viberzi however decreased dosage down to 75 mg twice daily.  She has had good results with this medication.  In regards to the GERD-we will discontinue Dexilant 60 mg once daily as it does not seem to be effective and start lansoprazole 30 mg twice daily.  She was instructed to contact  the clinic if issues with constipation occur or new GERD medication is not effective.   Diagnosis Plan   1. Gastroesophageal reflux disease without esophagitis  lansoprazole (PREVACID) 30 MG capsule      2. Irritable bowel syndrome with diarrhea  Eluxadoline 75 MG tablet          Return in about 1 year (around 8/15/2024).        Electronically Signed by: Shannon Jaimes PA-C , August 15, 2023 15:17 EDT       CC:   Uli Oleary MD

## 2023-09-26 DIAGNOSIS — K21.9 GASTROESOPHAGEAL REFLUX DISEASE WITHOUT ESOPHAGITIS: ICD-10-CM

## 2023-09-26 RX ORDER — DEXLANSOPRAZOLE 60 MG/1
CAPSULE, DELAYED RELEASE ORAL
Qty: 30 CAPSULE | Refills: 11 | OUTPATIENT
Start: 2023-09-26

## 2023-09-28 DIAGNOSIS — K21.9 GASTROESOPHAGEAL REFLUX DISEASE WITHOUT ESOPHAGITIS: ICD-10-CM

## 2023-09-28 RX ORDER — DEXLANSOPRAZOLE 60 MG/1
CAPSULE, DELAYED RELEASE ORAL
Qty: 30 CAPSULE | Refills: 11 | OUTPATIENT
Start: 2023-09-28

## 2023-09-29 ENCOUNTER — TELEPHONE (OUTPATIENT)
Dept: GASTROENTEROLOGY | Facility: CLINIC | Age: 33
End: 2023-09-29
Payer: COMMERCIAL

## 2023-09-29 DIAGNOSIS — K21.9 GASTROESOPHAGEAL REFLUX DISEASE WITHOUT ESOPHAGITIS: Primary | ICD-10-CM

## 2023-09-29 DIAGNOSIS — K21.9 GASTROESOPHAGEAL REFLUX DISEASE WITHOUT ESOPHAGITIS: ICD-10-CM

## 2023-09-29 RX ORDER — DEXLANSOPRAZOLE 60 MG/1
60 CAPSULE, DELAYED RELEASE ORAL
Qty: 30 CAPSULE | Refills: 11 | Status: SHIPPED | OUTPATIENT
Start: 2023-09-29

## 2023-09-29 RX ORDER — LANSOPRAZOLE 30 MG/1
30 CAPSULE, DELAYED RELEASE ORAL 2 TIMES DAILY
Qty: 180 CAPSULE | Refills: 3 | Status: SHIPPED | OUTPATIENT
Start: 2023-09-29 | End: 2023-09-29

## 2023-09-29 NOTE — TELEPHONE ENCOUNTER
Patient called stating her insurance does not pay for the lansoprazole that was sent to the pharmacy today. She is needing Dexilant sent to the pharmacy that is what she was taking before that her insurance pays for it.

## 2023-12-12 ENCOUNTER — TELEPHONE (OUTPATIENT)
Dept: GASTROENTEROLOGY | Facility: CLINIC | Age: 33
End: 2023-12-12
Payer: COMMERCIAL

## 2023-12-12 DIAGNOSIS — K58.0 IRRITABLE BOWEL SYNDROME WITH DIARRHEA: ICD-10-CM

## 2023-12-12 RX ORDER — ELUXADOLINE 100 MG/1
100 TABLET, FILM COATED ORAL 2 TIMES DAILY
Qty: 60 TABLET | Refills: 5 | Status: SHIPPED | OUTPATIENT
Start: 2023-12-12

## 2024-06-07 DIAGNOSIS — K58.0 IRRITABLE BOWEL SYNDROME WITH DIARRHEA: ICD-10-CM

## 2024-06-10 RX ORDER — ELUXADOLINE 100 MG/1
TABLET, FILM COATED ORAL
Qty: 60 TABLET | Refills: 5 | Status: SHIPPED | OUTPATIENT
Start: 2024-06-10

## 2024-09-04 DIAGNOSIS — K21.9 GASTROESOPHAGEAL REFLUX DISEASE WITHOUT ESOPHAGITIS: ICD-10-CM

## 2024-09-04 RX ORDER — DEXLANSOPRAZOLE 60 MG/1
60 CAPSULE, DELAYED RELEASE ORAL
Qty: 30 CAPSULE | Refills: 0 | Status: SHIPPED | OUTPATIENT
Start: 2024-09-04

## 2024-10-03 ENCOUNTER — OFFICE VISIT (OUTPATIENT)
Dept: SURGERY | Facility: CLINIC | Age: 34
End: 2024-10-03
Payer: COMMERCIAL

## 2024-10-03 VITALS — BODY MASS INDEX: 37.28 KG/M2 | HEIGHT: 66 IN | WEIGHT: 232 LBS

## 2024-10-03 DIAGNOSIS — K62.5 RECTAL BLEEDING: ICD-10-CM

## 2024-10-03 DIAGNOSIS — E66.9 OBESITY (BMI 30-39.9): ICD-10-CM

## 2024-10-03 DIAGNOSIS — K42.9 UMBILICAL HERNIA WITHOUT OBSTRUCTION AND WITHOUT GANGRENE: Primary | ICD-10-CM

## 2024-10-03 PROCEDURE — 1159F MED LIST DOCD IN RCRD: CPT | Performed by: SURGERY

## 2024-10-03 PROCEDURE — 1160F RVW MEDS BY RX/DR IN RCRD: CPT | Performed by: SURGERY

## 2024-10-03 PROCEDURE — 99204 OFFICE O/P NEW MOD 45 MIN: CPT | Performed by: SURGERY

## 2024-10-03 RX ORDER — POLYETHYLENE GLYCOL 3350, SODIUM CHLORIDE, SODIUM BICARBONATE, POTASSIUM CHLORIDE 420; 11.2; 5.72; 1.48 G/4L; G/4L; G/4L; G/4L
4000 POWDER, FOR SOLUTION ORAL ONCE
Qty: 4000 ML | Refills: 0 | Status: SHIPPED | OUTPATIENT
Start: 2024-10-03 | End: 2024-10-03

## 2024-10-03 NOTE — PROGRESS NOTES
Subjective   Sara Deng is a 34 y.o. female.     Chief Complaint: umbilical hernia    History of Present Illness She is a 35 yo who has had a bulge above the umbilicus. She also has some rectal bleeding after lifting heavy things. She has had polyps in the past. No specific anal pain.    The following portions of the patient's history were reviewed and updated as appropriate: current medications, past family history, past medical history, past social history, past surgical history and problem list.    Review of Systems   Constitutional:  Negative for activity change, appetite change, chills, fever and unexpected weight change.   HENT:  Negative for congestion, facial swelling and sore throat.    Eyes:  Negative for photophobia and visual disturbance.   Respiratory:  Negative for chest tightness, shortness of breath and wheezing.    Cardiovascular:  Negative for chest pain, palpitations and leg swelling.   Gastrointestinal:  Positive for abdominal pain and anal bleeding. Negative for abdominal distention, blood in stool, constipation, diarrhea, nausea, rectal pain and vomiting.   Endocrine: Negative for cold intolerance, heat intolerance, polydipsia and polyuria.   Genitourinary:  Negative for difficulty urinating, dysuria, flank pain and urgency.   Musculoskeletal:  Negative for back pain and myalgias.   Skin:  Negative for rash and wound.   Allergic/Immunologic: Negative for immunocompromised state.   Neurological:  Negative for dizziness, seizures, syncope, light-headedness, numbness and headaches.   Hematological:  Negative for adenopathy. Does not bruise/bleed easily.   Psychiatric/Behavioral:  Negative for behavioral problems and confusion. The patient is not nervous/anxious.        Objective   Physical Exam  Vitals reviewed.   Constitutional:       General: She is not in acute distress.     Appearance: She is well-developed. She is not ill-appearing.      Comments: The bulge in the epigastrium and  may be a diastasis. She rao have a small umbilical hernia but it is not tender.   HENT:      Head: Normocephalic. No laceration. Hair is normal.      Right Ear: Hearing and ear canal normal.      Left Ear: Hearing and ear canal normal.      Nose: Nose normal.      Right Sinus: No maxillary sinus tenderness or frontal sinus tenderness.      Left Sinus: No maxillary sinus tenderness or frontal sinus tenderness.   Eyes:      General: Lids are normal.      Conjunctiva/sclera: Conjunctivae normal.      Pupils: Pupils are equal, round, and reactive to light.   Neck:      Thyroid: No thyroid mass or thyromegaly.      Vascular: No JVD.      Trachea: No tracheal tenderness or tracheal deviation.   Cardiovascular:      Rate and Rhythm: Normal rate and regular rhythm.      Heart sounds: No murmur heard.     No gallop.   Pulmonary:      Effort: Pulmonary effort is normal.      Breath sounds: Normal breath sounds. No stridor. No wheezing.   Chest:      Chest wall: No tenderness.   Abdominal:      General: Bowel sounds are normal. There is no distension.      Palpations: Abdomen is soft. There is no mass.      Tenderness: There is no abdominal tenderness. There is no guarding or rebound.      Hernia: A hernia is present.   Musculoskeletal:         General: No deformity.      Cervical back: Normal range of motion.   Lymphadenopathy:      Cervical: No cervical adenopathy.      Upper Body:      Right upper body: No supraclavicular adenopathy.      Left upper body: No supraclavicular adenopathy.   Skin:     General: Skin is warm and dry.      Coloration: Skin is not pale.      Findings: No erythema or rash.   Neurological:      Mental Status: She is alert and oriented to person, place, and time.      Motor: No abnormal muscle tone.   Psychiatric:         Behavior: Behavior normal.         Thought Content: Thought content normal.         Past Medical History:   Diagnosis Date    Arthritis        Family History   Problem Relation Age  of Onset    No Known Problems Mother     No Known Problems Father        Social History     Tobacco Use    Smoking status: Every Day     Current packs/day: 0.50     Average packs/day: 0.5 packs/day for 20.0 years (10.0 ttl pk-yrs)     Types: Cigarettes     Passive exposure: Current    Smokeless tobacco: Never   Vaping Use    Vaping status: Never Used   Substance Use Topics    Alcohol use: Not Currently     Comment: Occasionally    Drug use: Never       Past Surgical History:   Procedure Laterality Date    COLONOSCOPY N/A 7/5/2022    Procedure: COLONOSCOPY FOR SCREENING;  Surgeon: Anel Barrera MD;  Location: [facility];  Service: Gastroenterology;  Laterality: N/A;    ENDOSCOPY N/A 7/5/2022    Procedure: ESOPHAGOGASTRODUODENOSCOPY WITH BIOPSY;  Surgeon: Anel Barrera MD;  Location: [facility];  Service: Gastroenterology;  Laterality: N/A;    WISDOM TOOTH EXTRACTION N/A        Current Outpatient Medications   Medication Instructions    dexlansoprazole (DEXILANT) 60 mg, Oral, Every Morning Before Breakfast    FiberCon 1,250 mg, Oral, Daily    polyethylene glycol-electrolytes (NULYTELY) 420 g solution 4,000 mL, Oral, Once    Viberzi 100 MG tablet TAKE ONE TABLET BY MOUTH TWO TIMES PER DAY AS DIRECTED         Assessment & Plan   Diagnoses and all orders for this visit:    1. Umbilical hernia without obstruction and without gangrene (Primary)  -     CT Abdomen Pelvis With Contrast; Future  -     Case Request; Standing  -     Case Request    2. Rectal bleeding  -     Case Request; Standing  -     Case Request    3. Obesity (BMI 30-39.9)    Other orders  -     polyethylene glycol-electrolytes (NULYTELY) 420 g solution; Take 4,000 mL by mouth 1 (One) Time for 1 dose.  Dispense: 4000 mL; Refill: 0  -     Follow Anesthesia Guidelines / Protocol; Future  -     Follow Anesthesia Guidelines / Protocol; Standing  -     Verify / Perform Chlorhexidine Skin Prep; Standing  -     Obtain Informed Consent;  Future  -     Provide NPO Instructions to Patient; Future  -     Chlorhexidine Skin Prep; Future  -     Place Sequential Compression Device; Standing  -     Maintain Sequential Compression Device; Standing    Check CT and colonoscopy             This document has been electronically signed by Ryan Beltran MD   October 3, 2024 14:15 EDT

## 2024-10-09 DIAGNOSIS — K21.9 GASTROESOPHAGEAL REFLUX DISEASE WITHOUT ESOPHAGITIS: ICD-10-CM

## 2024-10-09 RX ORDER — DEXLANSOPRAZOLE 60 MG/1
CAPSULE, DELAYED RELEASE ORAL
Refills: 0 | OUTPATIENT
Start: 2024-10-09

## 2024-10-10 ENCOUNTER — TELEPHONE (OUTPATIENT)
Dept: GASTROENTEROLOGY | Facility: CLINIC | Age: 34
End: 2024-10-10
Payer: COMMERCIAL

## 2024-10-10 DIAGNOSIS — K21.9 GASTROESOPHAGEAL REFLUX DISEASE WITHOUT ESOPHAGITIS: ICD-10-CM

## 2024-10-10 RX ORDER — DEXLANSOPRAZOLE 60 MG/1
60 CAPSULE, DELAYED RELEASE ORAL
Qty: 30 CAPSULE | Refills: 5 | Status: SHIPPED | OUTPATIENT
Start: 2024-10-10

## 2024-10-11 ENCOUNTER — ANESTHESIA EVENT (OUTPATIENT)
Dept: PERIOP | Facility: HOSPITAL | Age: 34
End: 2024-10-11
Payer: COMMERCIAL

## 2024-10-11 ENCOUNTER — ANESTHESIA (OUTPATIENT)
Dept: PERIOP | Facility: HOSPITAL | Age: 34
End: 2024-10-11
Payer: COMMERCIAL

## 2024-10-11 ENCOUNTER — HOSPITAL ENCOUNTER (OUTPATIENT)
Facility: HOSPITAL | Age: 34
Setting detail: HOSPITAL OUTPATIENT SURGERY
Discharge: HOME | End: 2024-10-11
Attending: SURGERY | Admitting: SURGERY
Payer: COMMERCIAL

## 2024-10-11 VITALS
DIASTOLIC BLOOD PRESSURE: 82 MMHG | BODY MASS INDEX: 37.28 KG/M2 | SYSTOLIC BLOOD PRESSURE: 112 MMHG | RESPIRATION RATE: 20 BRPM | HEIGHT: 66 IN | TEMPERATURE: 97.5 F | WEIGHT: 232 LBS | HEART RATE: 78 BPM | OXYGEN SATURATION: 94 %

## 2024-10-11 LAB
B-HCG UR QL: NEGATIVE
EXPIRATION DATE: NORMAL
INTERNAL NEGATIVE CONTROL: NEGATIVE
INTERNAL POSITIVE CONTROL: POSITIVE
Lab: NORMAL

## 2024-10-11 PROCEDURE — 25010000002 PROPOFOL 200 MG/20ML EMULSION: Performed by: NURSE ANESTHETIST, CERTIFIED REGISTERED

## 2024-10-11 PROCEDURE — 45378 DIAGNOSTIC COLONOSCOPY: CPT | Performed by: SURGERY

## 2024-10-11 PROCEDURE — S0260 H&P FOR SURGERY: HCPCS | Performed by: SURGERY

## 2024-10-11 PROCEDURE — 81025 URINE PREGNANCY TEST: CPT | Performed by: ANESTHESIOLOGY

## 2024-10-11 PROCEDURE — 25010000002 MIDAZOLAM PER 1 MG: Performed by: NURSE ANESTHETIST, CERTIFIED REGISTERED

## 2024-10-11 RX ORDER — SODIUM CHLORIDE 9 MG/ML
40 INJECTION, SOLUTION INTRAVENOUS AS NEEDED
Status: DISCONTINUED | OUTPATIENT
Start: 2024-10-11 | End: 2024-10-11 | Stop reason: HOSPADM

## 2024-10-11 RX ORDER — MEPERIDINE HYDROCHLORIDE 25 MG/ML
12.5 INJECTION INTRAMUSCULAR; INTRAVENOUS; SUBCUTANEOUS
Status: DISCONTINUED | OUTPATIENT
Start: 2024-10-11 | End: 2024-10-11 | Stop reason: HOSPADM

## 2024-10-11 RX ORDER — MIDAZOLAM HYDROCHLORIDE 1 MG/ML
INJECTION INTRAMUSCULAR; INTRAVENOUS AS NEEDED
Status: DISCONTINUED | OUTPATIENT
Start: 2024-10-11 | End: 2024-10-11 | Stop reason: SURG

## 2024-10-11 RX ORDER — SODIUM CHLORIDE 0.9 % (FLUSH) 0.9 %
10 SYRINGE (ML) INJECTION EVERY 12 HOURS SCHEDULED
Status: DISCONTINUED | OUTPATIENT
Start: 2024-10-11 | End: 2024-10-11 | Stop reason: HOSPADM

## 2024-10-11 RX ORDER — MIDAZOLAM HYDROCHLORIDE 1 MG/ML
1 INJECTION INTRAMUSCULAR; INTRAVENOUS
Status: DISCONTINUED | OUTPATIENT
Start: 2024-10-11 | End: 2024-10-11 | Stop reason: HOSPADM

## 2024-10-11 RX ORDER — SODIUM CHLORIDE, SODIUM LACTATE, POTASSIUM CHLORIDE, CALCIUM CHLORIDE 600; 310; 30; 20 MG/100ML; MG/100ML; MG/100ML; MG/100ML
125 INJECTION, SOLUTION INTRAVENOUS ONCE
Status: DISCONTINUED | OUTPATIENT
Start: 2024-10-11 | End: 2024-10-11 | Stop reason: HOSPADM

## 2024-10-11 RX ORDER — PROPOFOL 10 MG/ML
INJECTION, EMULSION INTRAVENOUS AS NEEDED
Status: DISCONTINUED | OUTPATIENT
Start: 2024-10-11 | End: 2024-10-11 | Stop reason: SURG

## 2024-10-11 RX ORDER — IPRATROPIUM BROMIDE AND ALBUTEROL SULFATE 2.5; .5 MG/3ML; MG/3ML
3 SOLUTION RESPIRATORY (INHALATION) ONCE AS NEEDED
Status: DISCONTINUED | OUTPATIENT
Start: 2024-10-11 | End: 2024-10-11 | Stop reason: HOSPADM

## 2024-10-11 RX ORDER — LOSARTAN POTASSIUM AND HYDROCHLOROTHIAZIDE 12.5; 5 MG/1; MG/1
1 TABLET ORAL DAILY
COMMUNITY

## 2024-10-11 RX ORDER — SODIUM CHLORIDE 0.9 % (FLUSH) 0.9 %
10 SYRINGE (ML) INJECTION AS NEEDED
Status: DISCONTINUED | OUTPATIENT
Start: 2024-10-11 | End: 2024-10-11 | Stop reason: HOSPADM

## 2024-10-11 RX ORDER — ONDANSETRON 2 MG/ML
4 INJECTION INTRAMUSCULAR; INTRAVENOUS AS NEEDED
Status: DISCONTINUED | OUTPATIENT
Start: 2024-10-11 | End: 2024-10-11 | Stop reason: HOSPADM

## 2024-10-11 RX ORDER — METRONIDAZOLE 500 MG/1
500 TABLET ORAL 2 TIMES DAILY
COMMUNITY

## 2024-10-11 RX ORDER — FENTANYL CITRATE 50 UG/ML
50 INJECTION, SOLUTION INTRAMUSCULAR; INTRAVENOUS
Status: DISCONTINUED | OUTPATIENT
Start: 2024-10-11 | End: 2024-10-11 | Stop reason: HOSPADM

## 2024-10-11 RX ADMIN — PROPOFOL 150 MG: 10 INJECTION, EMULSION INTRAVENOUS at 11:54

## 2024-10-11 RX ADMIN — PROPOFOL 150 MG: 10 INJECTION, EMULSION INTRAVENOUS at 12:00

## 2024-10-11 RX ADMIN — MIDAZOLAM HYDROCHLORIDE 2 MG: 1 INJECTION, SOLUTION INTRAMUSCULAR; INTRAVENOUS at 11:51

## 2024-10-11 NOTE — ANESTHESIA PREPROCEDURE EVALUATION
Anesthesia Evaluation     no history of anesthetic complications:   NPO Solid Status: > 8 hours  NPO Liquid Status: > 8 hours           Airway   Mallampati: II  TM distance: >3 FB  Neck ROM: full  No difficulty expected  Dental - normal exam     Pulmonary - normal exam   (+) a smoker Current,  Cardiovascular - normal exam        Neuro/Psych  GI/Hepatic/Renal/Endo    (+) obesity, GERD, GI bleeding     Musculoskeletal     Abdominal  - normal exam    Bowel sounds: normal.   Substance History      OB/GYN          Other                      Anesthesia Plan    ASA 3     general     intravenous induction     Anesthetic plan, risks, benefits, and alternatives have been provided, discussed and informed consent has been obtained with: patient.      CODE STATUS:

## 2024-10-11 NOTE — H&P
Sara Deng is a 34 y.o. female.      Chief Complaint: umbilical hernia     History of Present Illness She is a 33 yo who has had a bulge above the umbilicus. She also has some rectal bleeding after lifting heavy things. She has had polyps in the past. No specific anal pain.     The following portions of the patient's history were reviewed and updated as appropriate: current medications, past family history, past medical history, past social history, past surgical history and problem list.     Review of Systems   Constitutional:  Negative for activity change, appetite change, chills, fever and unexpected weight change.   HENT:  Negative for congestion, facial swelling and sore throat.    Eyes:  Negative for photophobia and visual disturbance.   Respiratory:  Negative for chest tightness, shortness of breath and wheezing.    Cardiovascular:  Negative for chest pain, palpitations and leg swelling.   Gastrointestinal:  Positive for abdominal pain and anal bleeding. Negative for abdominal distention, blood in stool, constipation, diarrhea, nausea, rectal pain and vomiting.   Endocrine: Negative for cold intolerance, heat intolerance, polydipsia and polyuria.   Genitourinary:  Negative for difficulty urinating, dysuria, flank pain and urgency.   Musculoskeletal:  Negative for back pain and myalgias.   Skin:  Negative for rash and wound.   Allergic/Immunologic: Negative for immunocompromised state.   Neurological:  Negative for dizziness, seizures, syncope, light-headedness, numbness and headaches.   Hematological:  Negative for adenopathy. Does not bruise/bleed easily.   Psychiatric/Behavioral:  Negative for behavioral problems and confusion. The patient is not nervous/anxious.                Objective  Physical Exam  Vitals reviewed.   Constitutional:       General: She is not in acute distress.     Appearance: She is well-developed. She is not ill-appearing.      Comments: The bulge in the epigastrium and may  be a diastasis. She rao have a small umbilical hernia but it is not tender.   HENT:      Head: Normocephalic. No laceration. Hair is normal.      Right Ear: Hearing and ear canal normal.      Left Ear: Hearing and ear canal normal.      Nose: Nose normal.      Right Sinus: No maxillary sinus tenderness or frontal sinus tenderness.      Left Sinus: No maxillary sinus tenderness or frontal sinus tenderness.   Eyes:      General: Lids are normal.      Conjunctiva/sclera: Conjunctivae normal.      Pupils: Pupils are equal, round, and reactive to light.   Neck:      Thyroid: No thyroid mass or thyromegaly.      Vascular: No JVD.      Trachea: No tracheal tenderness or tracheal deviation.   Cardiovascular:      Rate and Rhythm: Normal rate and regular rhythm.      Heart sounds: No murmur heard.     No gallop.   Pulmonary:      Effort: Pulmonary effort is normal.      Breath sounds: Normal breath sounds. No stridor. No wheezing.   Chest:      Chest wall: No tenderness.   Abdominal:      General: Bowel sounds are normal. There is no distension.      Palpations: Abdomen is soft. There is no mass.      Tenderness: There is no abdominal tenderness. There is no guarding or rebound.      Hernia: A hernia is present.   Musculoskeletal:         General: No deformity.      Cervical back: Normal range of motion.   Lymphadenopathy:      Cervical: No cervical adenopathy.      Upper Body:      Right upper body: No supraclavicular adenopathy.      Left upper body: No supraclavicular adenopathy.   Skin:     General: Skin is warm and dry.      Coloration: Skin is not pale.      Findings: No erythema or rash.   Neurological:      Mental Status: She is alert and oriented to person, place, and time.      Motor: No abnormal muscle tone.   Psychiatric:         Behavior: Behavior normal.         Thought Content: Thought content normal.            Medical History        Past Medical History:   Diagnosis Date    Arthritis                     Family History   Problem Relation Age of Onset    No Known Problems Mother      No Known Problems Father           Social History   Social History            Tobacco Use    Smoking status: Every Day       Current packs/day: 0.50       Average packs/day: 0.5 packs/day for 20.0 years (10.0 ttl pk-yrs)       Types: Cigarettes       Passive exposure: Current    Smokeless tobacco: Never   Vaping Use    Vaping status: Never Used   Substance Use Topics    Alcohol use: Not Currently       Comment: Occasionally    Drug use: Never            Surgical History         Past Surgical History:   Procedure Laterality Date    COLONOSCOPY N/A 7/5/2022     Procedure: COLONOSCOPY FOR SCREENING;  Surgeon: Anel Barrera MD;  Location: [facility];  Service: Gastroenterology;  Laterality: N/A;    ENDOSCOPY N/A 7/5/2022     Procedure: ESOPHAGOGASTRODUODENOSCOPY WITH BIOPSY;  Surgeon: Anel Barrera MD;  Location: [facility];  Service: Gastroenterology;  Laterality: N/A;    WISDOM TOOTH EXTRACTION N/A                   Current Outpatient Medications   Medication Instructions    dexlansoprazole (DEXILANT) 60 mg, Oral, Every Morning Before Breakfast    FiberCon 1,250 mg, Oral, Daily    polyethylene glycol-electrolytes (NULYTELY) 420 g solution 4,000 mL, Oral, Once    Viberzi 100 MG tablet TAKE ONE TABLET BY MOUTH TWO TIMES PER DAY AS DIRECTED                  Assessment & Plan  Diagnoses and all orders for this visit:     1. Umbilical hernia without obstruction and without gangrene (Primary)  -     CT Abdomen Pelvis With Contrast; Future  -     Case Request; Standing  -     Case Request     2. Rectal bleeding  -     Case Request; Standing  -     Case Request     3. Obesity (BMI 30-39.9)     Other orders  -     polyethylene glycol-electrolytes (NULYTELY) 420 g solution; Take 4,000 mL by mouth 1 (One) Time for 1 dose.  Dispense: 4000 mL; Refill: 0  -     Follow Anesthesia Guidelines / Protocol; Future  -     Follow  Anesthesia Guidelines / Protocol; Standing  -     Verify / Perform Chlorhexidine Skin Prep; Standing  -     Obtain Informed Consent; Future  -     Provide NPO Instructions to Patient; Future  -     Chlorhexidine Skin Prep; Future  -     Place Sequential Compression Device; Standing  -     Maintain Sequential Compression Device; Standing     Check CT and colonoscopy

## 2024-10-11 NOTE — OP NOTE
COLONOSCOPY  Procedure Note    Sara Deng  10/11/2024    Pre-op Diagnosis:   Umbilical hernia without obstruction and without gangrene [K42.9]  Rectal bleeding [K62.5]    Post-op Diagnosis: normal colon        Procedure(s):  COLONOSCOPY    Surgeon(s):  Ryan Beltran MD    Anesthesia: Anesthesia type not filed in the log.    Staff:   Circulator: Akua Arias RN  Endo Technician: James Rosario    Estimated Blood Loss: none    Specimens:                * No orders in the log *      Drains: * No LDAs found *    Procedure: The scope passed from the rectum to the cecum. The ileocecal valve was unremarkable. The terminal ileum was unremarkable. No polyps or inflammation was seen as the scope was slowly withdrawn. No signs of bleeding. No other abnormalities were seen.    Findings: normal colon           Complications: none   Grafts / Implants N/A    Ryan Beltran MD     Date: 10/11/2024  Time: 12:03 EDT

## 2024-12-03 DIAGNOSIS — K58.0 IRRITABLE BOWEL SYNDROME WITH DIARRHEA: ICD-10-CM

## 2024-12-03 RX ORDER — ELUXADOLINE 100 MG/1
TABLET, FILM COATED ORAL
Qty: 60 TABLET | Refills: 0 | OUTPATIENT
Start: 2024-12-03

## 2024-12-11 DIAGNOSIS — K58.0 IRRITABLE BOWEL SYNDROME WITH DIARRHEA: ICD-10-CM

## 2024-12-12 RX ORDER — ELUXADOLINE 100 MG/1
TABLET, FILM COATED ORAL
Qty: 60 TABLET | Refills: 0 | OUTPATIENT
Start: 2024-12-12

## 2024-12-20 ENCOUNTER — OFFICE VISIT (OUTPATIENT)
Dept: GASTROENTEROLOGY | Facility: CLINIC | Age: 34
End: 2024-12-20
Payer: COMMERCIAL

## 2024-12-20 VITALS
DIASTOLIC BLOOD PRESSURE: 112 MMHG | BODY MASS INDEX: 36.8 KG/M2 | HEIGHT: 66 IN | WEIGHT: 229 LBS | SYSTOLIC BLOOD PRESSURE: 174 MMHG | HEART RATE: 97 BPM

## 2024-12-20 DIAGNOSIS — K58.0 IRRITABLE BOWEL SYNDROME WITH DIARRHEA: ICD-10-CM

## 2024-12-20 DIAGNOSIS — F10.11 HISTORY OF ALCOHOL ABUSE: Primary | ICD-10-CM

## 2024-12-20 DIAGNOSIS — K21.9 GASTROESOPHAGEAL REFLUX DISEASE WITHOUT ESOPHAGITIS: ICD-10-CM

## 2024-12-20 RX ORDER — ELUXADOLINE 100 MG/1
100 TABLET, FILM COATED ORAL 2 TIMES DAILY
Qty: 60 TABLET | Refills: 5 | Status: SHIPPED | OUTPATIENT
Start: 2024-12-20

## 2024-12-20 RX ORDER — DEXLANSOPRAZOLE 60 MG/1
60 CAPSULE, DELAYED RELEASE ORAL
Qty: 30 CAPSULE | Refills: 5 | Status: SHIPPED | OUTPATIENT
Start: 2024-12-20

## 2024-12-20 NOTE — PROGRESS NOTES
Chief Complaint  GERD    Sara Deng is a 34 y.o. female who presents today to [facility] for GERD.    HPI:   HPI     Interval History:    Today, patient presents for follow-up.  Patient states that she has been doing well on Dexilant and Viberzi.  She notes that her GERD is well-controlled on Dexilant as long as she does not eat excessive spicy foods.  She notes maybe 1 flare each month.  Patient reports that for the last several years she has had a problem with alcohol abuse secondary to the loss of her twin sister.  Patient discontinued alcohol use 2 days ago.  No evidence of alcohol withdrawal on exam.  Patient reports that since discontinuing the alcohol and on her Viberzi 100 mg twice daily her bowel movements occur every day or every other day and are rated as BSS 4.  She has no other complaints today.        Previous History:   Past Medical History:   Diagnosis Date    Arthritis       Past Surgical History:   Procedure Laterality Date    COLONOSCOPY N/A 7/5/2022    Procedure: COLONOSCOPY FOR SCREENING;  Surgeon: Anel Barrera MD;  Location: [facility];  Service: Gastroenterology;  Laterality: N/A;    COLONOSCOPY N/A 10/11/2024    Procedure: COLONOSCOPY;  Surgeon: Ryan Beltran MD;  [facility];  Service: Gastroenterology;  Laterality: N/A;    ENDOSCOPY N/A 7/5/2022    Procedure: ESOPHAGOGASTRODUODENOSCOPY WITH BIOPSY;  Surgeon: Anel Barrera MD;  [facility];  Service: Gastroenterology;  Laterality: N/A;    WISDOM TOOTH EXTRACTION N/A       Social History     Socioeconomic History    Marital status:    Tobacco Use    Smoking status: Every Day     Current packs/day: 0.50     Average packs/day: 0.5 packs/day for 20.0 years (10.0 ttl pk-yrs)     Types: Cigarettes     Passive exposure: Current    Smokeless tobacco: Never   Vaping Use    Vaping status: Never Used   Substance and Sexual Activity    Alcohol  "use: Not Currently     Comment: Occasionally    Drug use: Never    Sexual activity: Defer        Current Medications:  Current Outpatient Medications   Medication Sig Dispense Refill    calcium polycarbophil (FiberCon) 625 MG tablet Take 2 tablets by mouth Daily. 60 tablet 3    dexlansoprazole (DEXILANT) 60 MG capsule Take 1 capsule by mouth Every Morning Before Breakfast. 30 capsule 5    Eluxadoline (Viberzi) 100 MG tablet Take 1 tablet by mouth 2 (Two) Times a Day. 60 tablet 5    losartan-hydrochlorothiazide (HYZAAR) 50-12.5 MG per tablet Take 1 tablet by mouth Daily.      metroNIDAZOLE (FLAGYL) 500 MG tablet Take 1 tablet by mouth 2 (Two) Times a Day.       No current facility-administered medications for this visit.       Allergies:   Allergies   Allergen Reactions    Tylenol With Codeine #3 [Acetaminophen-Codeine] Hives       Vitals:   BP (!) 174/112   Pulse 97   Ht 167.6 cm (66\")   Wt 104 kg (229 lb)   BMI 36.96 kg/m²   Estimated body mass index is 36.96 kg/m² as calculated from the following:    Height as of this encounter: 167.6 cm (66\").    Weight as of this encounter: 104 kg (229 lb).    ROS:   Review of Systems   Constitutional: Negative.    HENT: Negative.     Respiratory: Negative.     Cardiovascular: Negative.    Gastrointestinal: Negative.  Negative for abdominal distention, abdominal pain, anal bleeding, blood in stool, constipation, diarrhea, nausea, rectal pain and vomiting.   All other systems reviewed and are negative.       Physical Exam:   Physical Exam  Vitals reviewed.   Constitutional:       General: She is not in acute distress.     Appearance: Normal appearance. She is well-groomed. She is obese. She is not toxic-appearing.   HENT:      Head: Normocephalic and atraumatic.      Mouth/Throat:      Mouth: Mucous membranes are moist.   Eyes:      Extraocular Movements: Extraocular movements intact.   Cardiovascular:      Rate and Rhythm: Normal rate and regular rhythm.      Heart sounds: " "Normal heart sounds. No murmur heard.  Pulmonary:      Effort: Pulmonary effort is normal. No respiratory distress.      Breath sounds: Normal breath sounds. No stridor. No wheezing or rales.   Abdominal:      General: Bowel sounds are normal. There is no distension.      Palpations: Abdomen is soft. There is no mass.      Tenderness: There is no abdominal tenderness. There is no guarding or rebound.      Hernia: No hernia is present.   Skin:     General: Skin is warm.      Coloration: Skin is not jaundiced.      Findings: No rash.   Neurological:      Mental Status: She is alert and oriented to person, place, and time.   Psychiatric:         Mood and Affect: Mood and affect normal.         Speech: Speech normal.         Behavior: Behavior normal. Behavior is cooperative.         Thought Content: Thought content normal.          Lab Results:   No results found for: \"WBC\", \"HGB\", \"HCT\", \"MCV\", \"RDW\", \"PLT\", \"NEUTRORELPCT\", \"LYMPHORELPCT\", \"MONORELPCT\", \"EOSRELPCT\", \"BASORELPCT\", \"NEUTROABS\", \"LYMPHSABS\"    No results found for: \"NA\", \"K\", \"CO2\", \"CL\", \"BUN\", \"CREATININE\", \"EGFRIFNONA\", \"EGFRIFAFRI\", \"GLUCOSE\", \"CALCIUM\", \"ALKPHOS\", \"AST\", \"ALT\", \"BILITOT\", \"ALBUMIN\", \"PROTEINTOT\", \"MG\", \"PHOS\"    Pathology:        Endoscopy:        Imaging:       Results review: During today's encounter, all relevant clinical data has been reviewed.      Assessment and Plan    1. History of alcohol abuse (Primary)  She notes personal history of recent alcohol abuse.  Patient states that she decided 2 days ago that she was discontinuing use.  Patient has not had any alcohol in 2 days.  No evidence of withdrawal on exam.  Patient has not tremulousness or diaphoretic.  Mild tachycardia.  She denies evidence of hallucination or confusion.  Patient was educated on symptoms of withdrawal and delirium tremens.  She was instructed to proceed to the ED if any of the symptoms were to develop.    2. Irritable bowel syndrome with diarrhea  -     " Eluxadoline (Viberzi) 100 MG tablet; Take 1 tablet by mouth 2 (Two) Times a Day.  Dispense: 60 tablet; Refill: 5    3. Gastroesophageal reflux disease without esophagitis  -     dexlansoprazole (DEXILANT) 60 MG capsule; Take 1 capsule by mouth Every Morning Before Breakfast.  Dispense: 30 capsule; Refill: 5    New Medications:   New Medications Ordered This Visit   Medications    Eluxadoline (Viberzi) 100 MG tablet     Sig: Take 1 tablet by mouth 2 (Two) Times a Day.     Dispense:  60 tablet     Refill:  5    dexlansoprazole (DEXILANT) 60 MG capsule     Sig: Take 1 capsule by mouth Every Morning Before Breakfast.     Dispense:  30 capsule     Refill:  5       Discontinued Medications:   Medications Discontinued During This Encounter   Medication Reason    Viberzi 100 MG tablet Reorder    dexlansoprazole (DEXILANT) 60 MG capsule Reorder        Visit Diagnoses:    ICD-10-CM ICD-9-CM   1. History of alcohol abuse  F10.11 305.03   2. Irritable bowel syndrome with diarrhea  K58.0 564.1   3. Gastroesophageal reflux disease without esophagitis  K21.9 530.81            Follow Up:   Return in about 6 months (around 6/20/2025).    The patient was in agreement with the plan and all questions were answered to patient's satisfaction.        This document has been electronically signed by Joana Nava PA-C   December 20, 2024 14:17 EST    Dictated Utilizing Dragon Dictation: Part of this note may be an electronic transcription/translation of spoken language to printed text using the Dragon Dictation System.    CC:  No ref. provider found  Uli Oleary MD

## 2025-06-13 ENCOUNTER — TELEPHONE (OUTPATIENT)
Dept: GASTROENTEROLOGY | Facility: CLINIC | Age: 35
End: 2025-06-13
Payer: COMMERCIAL

## 2025-06-13 DIAGNOSIS — K21.9 GASTROESOPHAGEAL REFLUX DISEASE WITHOUT ESOPHAGITIS: ICD-10-CM

## 2025-06-13 DIAGNOSIS — K58.0 IRRITABLE BOWEL SYNDROME WITH DIARRHEA: ICD-10-CM

## 2025-06-13 RX ORDER — DEXLANSOPRAZOLE 60 MG/1
60 CAPSULE, DELAYED RELEASE ORAL
Qty: 30 CAPSULE | Refills: 5 | Status: SHIPPED | OUTPATIENT
Start: 2025-06-13

## 2025-06-13 NOTE — TELEPHONE ENCOUNTER
Caller: Sara Deng    Relationship: Self    Best call back number: [phone]     Requested Prescriptions:   Requested Prescriptions     Pending Prescriptions Disp Refills    dexlansoprazole (DEXILANT) 60 MG capsule 30 capsule 5     Sig: Take 1 capsule by mouth Every Morning Before Breakfast.        Pharmacy where request should be sent:      Last office visit with prescribing clinician: 12/20/2024   Last telemedicine visit with prescribing clinician: Visit date not found   Next office visit with prescribing clinician: 6/20/2025     Additional details provided by patient: [facility] REQUIRES A P.A PT IS COMPLETELY OUT OF MEDICATION    Does the patient have less than a 3 day supply:  [x] Yes  [] No    Would you like a call back once the refill request has been completed: [x] Yes [] No    If the office needs to give you a call back, can they leave a voicemail: [x] Yes [] No    Evelia Davila   06/13/25 13:30 EDT

## 2025-06-16 NOTE — TELEPHONE ENCOUNTER
Hub staff attempted to follow warm transfer process and was unsuccessful     Caller: Sara Deng    Relationship to patient: Self    Best call back number: [phone]    Patient is needing: PT CALLED TO CHECK STATUS OF PA FOR THE DEXILANT. SHE'S BEEN WITHOUT IT SINCE FRIDAY 6.13

## 2025-06-20 ENCOUNTER — TELEPHONE (OUTPATIENT)
Dept: GASTROENTEROLOGY | Facility: CLINIC | Age: 35
End: 2025-06-20

## 2025-06-20 DIAGNOSIS — K58.0 IRRITABLE BOWEL SYNDROME WITH DIARRHEA: ICD-10-CM

## 2025-06-20 NOTE — TELEPHONE ENCOUNTER
Caller: Sara Deng    Relationship to patient: Self    Best call back number: [phone]     Patient is needing: PT RX FOR DEXILANT REQUIRES PRIOR AUTH. PLEASE CALL TO CONFIRM WHEN PA PLACED.

## 2025-06-23 ENCOUNTER — TELEPHONE (OUTPATIENT)
Dept: GASTROENTEROLOGY | Facility: CLINIC | Age: 35
End: 2025-06-23

## 2025-06-24 NOTE — TELEPHONE ENCOUNTER
I tried re submitting the PA through cover my meds. This is what it says  Our records indicate that this request has already been sent to the patient's insurance plan. To follow up, please call the plan directly.

## 2025-06-25 RX ORDER — ELUXADOLINE 100 MG/1
1 TABLET, FILM COATED ORAL 2 TIMES DAILY
Qty: 60 TABLET | Refills: 0 | Status: SHIPPED | OUTPATIENT
Start: 2025-06-25

## 2025-06-25 NOTE — TELEPHONE ENCOUNTER
I called [facility] patient's insurance company to see why her PA for Dexilant will not go thru. Per Keshia at [facility] they have a different last name on her insurance, they can not complete the PA without the correct last name.  The insurance company is not allowed to give me the last name. I called patient , no answer, I left a message on her VM to call clinic with the correct last name on her insurance in order to complete the PA..

## 2025-06-25 NOTE — TELEPHONE ENCOUNTER
I called [facility] patient's insurance company to see why her PA for Dexilant will not go thru. Per Keshia at [facility] they have a different last name on her insurance, they can not complete the PA without the correct last name.  The insurance company is not allowed to give me the last name. I called patient , no answer, I left a message on her VM to call clinic with the correct last name on her insurance in order to complete the PA..

## 2025-06-26 ENCOUNTER — TELEPHONE (OUTPATIENT)
Dept: GASTROENTEROLOGY | Facility: CLINIC | Age: 35
End: 2025-06-26

## 2025-06-26 NOTE — TELEPHONE ENCOUNTER
Per patient, THE LAST NAME IS JODI.  Patient also stated she is needing a PA for Viberzi and wall as Dexilant.

## 2025-06-26 NOTE — TELEPHONE ENCOUNTER
PA for Dexilant resubmitted through cover my meds with the last name Yanna. Per cover my meds, The plan will fax you a determination, typically within 1 to 5 business days.    Per cover my meds no PA required for Dexilant .    PA for Viberzi 100mg submitted through cover my meds, per cover my meds, Member should be able to get the drug/product without a PA at this time.    I spoke to patient, notified her that  no PA is required for either medication.

## 2025-06-26 NOTE — TELEPHONE ENCOUNTER
Caller: RODGER BENÍTEZ     Relationship to patient: SELF    Best call back number: [phone]    Patient is needing: MESSAGE TO [employer], YESTERDAY [employer] WAS TRYING TO GET A HOLD OF PATIENT'S INSURANCE ANSWERING SECURITY QUESTIONS YESTERDAY MORNING, THE LAST NAME IS JODI.

## 2025-06-26 NOTE — TELEPHONE ENCOUNTER
New message regarding  from insurance, regarding PA for Dexilant.   CoverMy[employer]eds has identified an unforeseen error that may have prevented this request from being sent to the payer. Please reach out to the payer for assistance.

## 2025-07-29 DIAGNOSIS — K58.0 IRRITABLE BOWEL SYNDROME WITH DIARRHEA: ICD-10-CM

## 2025-07-30 RX ORDER — ELUXADOLINE 100 MG/1
1 TABLET, FILM COATED ORAL 2 TIMES DAILY
Qty: 60 TABLET | Refills: 0 | Status: SHIPPED | OUTPATIENT
Start: 2025-07-30

## (undated) DEVICE — THE BITE BLOCK MAXI, LATEX FREE STRAP IS USED TO PROTECT THE ENDOSCOPE INSERTION TUBE FROM BEING BITTEN BY THE PATIENT.

## (undated) DEVICE — TUBING, SUCTION, 1/4" X 20', STRAIGHT: Brand: MEDLINE INDUSTRIES, INC.

## (undated) DEVICE — Device

## (undated) DEVICE — FRCP BX RADJAW4 NDL 2.8 240CM LG OG BX40

## (undated) DEVICE — GOWN,REINF,POLY,ECL,PP SLV,XL: Brand: MEDLINE

## (undated) DEVICE — ENDOGATOR AUXILIARY WATER JET CONNECTOR: Brand: ENDOGATOR

## (undated) DEVICE — Device: Brand: DEFENDO AIR/WATER/SUCTION AND BIOPSY VALVE

## (undated) DEVICE — CONN Y IRR DISP 1P/U

## (undated) DEVICE — SINGLE PORT MANIFOLD: Brand: NEPTUNE 2

## (undated) DEVICE — SYR LUERLOK 30CC